# Patient Record
Sex: FEMALE | Race: WHITE | NOT HISPANIC OR LATINO | Employment: PART TIME | URBAN - NONMETROPOLITAN AREA
[De-identification: names, ages, dates, MRNs, and addresses within clinical notes are randomized per-mention and may not be internally consistent; named-entity substitution may affect disease eponyms.]

---

## 2017-08-22 ENCOUNTER — TELEPHONE (OUTPATIENT)
Dept: MEDICAL GROUP | Facility: CLINIC | Age: 42
End: 2017-08-22

## 2017-08-22 ENCOUNTER — NON-PROVIDER VISIT (OUTPATIENT)
Dept: MEDICAL GROUP | Facility: CLINIC | Age: 42
End: 2017-08-22
Payer: MEDICAID

## 2017-08-22 DIAGNOSIS — Z11.1 ENCOUNTER FOR PPD TEST: ICD-10-CM

## 2017-08-24 ENCOUNTER — APPOINTMENT (OUTPATIENT)
Dept: MEDICAL GROUP | Facility: CLINIC | Age: 42
End: 2017-08-24
Payer: MEDICAID

## 2017-11-20 ENCOUNTER — OFFICE VISIT (OUTPATIENT)
Dept: URGENT CARE | Facility: PHYSICIAN GROUP | Age: 42
End: 2017-11-20
Payer: MEDICAID

## 2017-11-20 VITALS
RESPIRATION RATE: 18 BRPM | HEIGHT: 67 IN | HEART RATE: 80 BPM | TEMPERATURE: 98.5 F | BODY MASS INDEX: 27.78 KG/M2 | OXYGEN SATURATION: 98 % | WEIGHT: 177 LBS

## 2017-11-20 DIAGNOSIS — J01.00 ACUTE NON-RECURRENT MAXILLARY SINUSITIS: ICD-10-CM

## 2017-11-20 DIAGNOSIS — L30.9 DERMATITIS: ICD-10-CM

## 2017-11-20 PROCEDURE — 99203 OFFICE O/P NEW LOW 30 MIN: CPT | Performed by: PHYSICIAN ASSISTANT

## 2017-11-20 RX ORDER — PREDNISONE 10 MG/1
TABLET ORAL
Qty: 30 QUANTITY SUFFICIENT | Refills: 0 | Status: SHIPPED | OUTPATIENT
Start: 2017-11-20 | End: 2017-12-17

## 2017-11-20 RX ORDER — IBUPROFEN 200 MG
200 TABLET ORAL EVERY 6 HOURS PRN
COMMUNITY
End: 2017-12-17

## 2017-11-20 RX ORDER — TRAMADOL HYDROCHLORIDE 50 MG/1
50 TABLET ORAL EVERY 4 HOURS PRN
COMMUNITY
End: 2017-12-17

## 2017-11-21 NOTE — PROGRESS NOTES
Chief Complaint   Patient presents with   • Rash   • Sinus Problem       HISTORY OF PRESENT ILLNESS: Patient is a 42 y.o. female who presents today for the following:    Patient comes in for evaluation of 2 separate issues. She complains of sinus congestion with clear drainage over the last couple of days. She complains of pressure in her face, ears, and a mild sore throat. Over-the-counter medication has not been helping. She also has a rash that she has had off and on for the past several years. She has done 10 scabies treatments over the last year with the most recent one being 3 days ago. She has not had any change in her symptoms doing these treatments. She complains of intense itching and has not had any change with over-the-counter medication or lotions. She has never been diagnosed with eczema or psoriasis. She had gastric bypass surgery 10 years ago and was diagnosed with lupus shortly after. She has had several courses of steroids since her bypass surgery and has not had any adverse effects.    There are no active problems to display for this patient.      Allergies:Diflucan [fd&c red #40 [place]-fluconazole]    Current Outpatient Prescriptions Ordered in [facility]   Medication Sig Dispense Refill   • tramadol (ULTRAM) 50 MG Tab Take 50 mg by mouth every four hours as needed.     • ibuprofen (MOTRIN) 200 MG Tab Take 200 mg by mouth every 6 hours as needed.     • predniSONE (DELTASONE) 10 MG Tab 40mg x 2 days; 30mg x 2 days; 20mg x 2 days; 10mg x 2 days; 5 mg x 2 days 30 Quantity Sufficient 0     No current Epic-ordered facility-administered medications on file.        No past medical history on file.    Social History   Substance Use Topics   • Smoking status: Current Every Day Smoker     Packs/day: 0.25   • Smokeless tobacco: Never Used   • Alcohol use No       No family status information on file.   No family history on file.    ROS:    Review of Systems   Constitutional: Negative for fever, chills, weight  "loss and malaise/fatigue.   HENT: Negative for nosebleeds,  and neck pain.    Eyes: Negative for blurred vision.   Respiratory: Negative for cough, sputum production, shortness of breath and wheezing.    Cardiovascular: Negative for chest pain, palpitations, orthopnea and leg swelling.   Gastrointestinal: Negative for heartburn, nausea, vomiting and abdominal pain.   Genitourinary: Negative for dysuria, urgency and frequency.       Exam:  Pulse 80, temperature 36.9 °C (98.5 °F), resp. rate 18, height 1.702 m (5' 7\"), weight 80.3 kg (177 lb), SpO2 98 %.  General: Well developed, well nourished. No distress.  HEENT: Conjunctiva clear, lids without ptosis, PERRL/EOMI. Ears normal shape and contour, canals are clear bilaterally, tympanic membranes are benign. Nasal mucosa benign. Oropharynx is without erythema, edema or exudates. Reasonable dentition.  Pulmonary: Clear to ausculation and percussion.  Normal effort. No rales, ronchi, or wheezing.   Cardiovascular: Regular rate and rhythm without murmur. No edema.   Neurologic: Grossly nonfocal.  Lymph: No cervical lymphadenopathy noted.  Skin: Scattered lesions that are vesicular in nature with areas of thick scaling skin. Almost the entire ulnar surface of the forearms are affected. Other lesions noted and bilateral axillary regions, gluteal folds, and bilateral EAC openings. Lesions are also noted around the mouth.  Psych: Normal mood. Alert and oriented x3. Judgment and insight is normal.    Assessment/Plan:  Discussed likely viral etiology of the sinus symptoms. Will treat dermatitis with steroids. Take all medications as directed. Follow-up with primary care provider for worsening or persistent symptoms.  1. Dermatitis  predniSONE (DELTASONE) 10 MG Tab   2. Acute non-recurrent maxillary sinusitis         "

## 2017-12-11 ENCOUNTER — OFFICE VISIT (OUTPATIENT)
Dept: URGENT CARE | Facility: PHYSICIAN GROUP | Age: 42
End: 2017-12-11
Payer: MEDICAID

## 2017-12-11 VITALS
SYSTOLIC BLOOD PRESSURE: 116 MMHG | DIASTOLIC BLOOD PRESSURE: 74 MMHG | HEART RATE: 74 BPM | BODY MASS INDEX: 26.3 KG/M2 | OXYGEN SATURATION: 100 % | HEIGHT: 67 IN | WEIGHT: 167.6 LBS | TEMPERATURE: 97.6 F | RESPIRATION RATE: 16 BRPM

## 2017-12-11 DIAGNOSIS — M06.9 RHEUMATOID ARTHRITIS INVOLVING MULTIPLE SITES, UNSPECIFIED RHEUMATOID FACTOR PRESENCE: ICD-10-CM

## 2017-12-11 PROCEDURE — 99214 OFFICE O/P EST MOD 30 MIN: CPT | Performed by: PHYSICIAN ASSISTANT

## 2017-12-11 RX ORDER — DICLOFENAC SODIUM 75 MG/1
75 TABLET, DELAYED RELEASE ORAL 2 TIMES DAILY
Qty: 60 TAB | Refills: 0 | Status: SHIPPED | OUTPATIENT
Start: 2017-12-11 | End: 2017-12-17

## 2017-12-11 NOTE — PROGRESS NOTES
Chief Complaint   Patient presents with   • Arthritis     sciatic pain Medication refill-tramadol       HISTORY OF PRESENT ILLNESS: Patient is a 42 y.o. female who presents today becauseShe like a refill on her Ultram. She is in the process of establishing with a pain management provider but cannot be seen for about a month, she has used Ultram in the past with decent pain management for chronic medical conditions including lupus, rheumatoid arthritis and sciatic back pain. She was advised that the  that we do not refill chronic pain medications through the urgent care. She has not been taking any other medications.    There are no active problems to display for this patient.      Allergies:Diflucan [fd&c red #40 [place]-fluconazole]    Current Outpatient Prescriptions Ordered in [facility]   Medication Sig Dispense Refill   • diclofenac EC (VOLTAREN) 75 MG Tablet Delayed Response Take 1 Tab by mouth 2 times a day. 60 Tab 0   • tramadol (ULTRAM) 50 MG Tab Take 50 mg by mouth every four hours as needed.     • ibuprofen (MOTRIN) 200 MG Tab Take 200 mg by mouth every 6 hours as needed.     • predniSONE (DELTASONE) 10 MG Tab 40mg x 2 days; 30mg x 2 days; 20mg x 2 days; 10mg x 2 days; 5 mg x 2 days 30 Quantity Sufficient 0     No current Epic-ordered facility-administered medications on file.        No past medical history on file.    Social History   Substance Use Topics   • Smoking status: Current Every Day Smoker     Packs/day: 0.25     Years: 6.00     Types: Cigarettes   • Smokeless tobacco: Never Used   • Alcohol use No       No family status information on file.   No family history on file.    ROS:  Review of Systems   Constitutional: Negative for fever, chills, weight loss and malaise/fatigue.   HENT: Negative for ear pain, nosebleeds, congestion, sore throat and neck pain.    Eyes: Negative for blurred vision.   Respiratory: Negative for cough, sputum production, shortness of breath and wheezing.   "  Cardiovascular: Negative for chest pain, palpitations, orthopnea and leg swelling.   Gastrointestinal: Negative for heartburn, nausea, vomiting and abdominal pain.   Genitourinary: Negative for dysuria, urgency and frequency.     Exam:  Blood pressure 116/74, pulse 74, temperature 36.4 °C (97.6 °F), resp. rate 16, height 1.702 m (5' 7\"), weight 76 kg (167 lb 9.6 oz), last menstrual period 12/05/2017, SpO2 100 %, not currently breastfeeding.  General:  Well nourished, well developed female in NAD  Head:Normocephalic, atraumatic  Eyes: PERRLA, EOM within normal limits, no conjunctival injection, no scleral icterus, visual fields and acuity grossly intact.  Extremities: no clubbing, cyanosis, or edema.    Please note that this dictation was created using voice recognition software. I have made every reasonable attempt to correct obvious errors, but I expect that there are errors of grammar and possibly content that I did not discover before finalizing the note.    Assessment/Plan:  1. Rheumatoid arthritis involving multiple sites, unspecified rheumatoid factor presence ([facility])  diclofenac EC (VOLTAREN) 75 MG Tablet Delayed Response       Followup with primary care in the next 7-10 days if not significantly improving, return to the urgent care or go to the emergency room sooner for any worsening of symptoms.       "

## 2017-12-11 NOTE — LETTER
December 11, 2017         Patient: Nancie Jernigan   YOB: 1975   Date of Visit: 12/11/2017           To Whom it May Concern:    Nancie Jernigan was seen in my clinic on 12/11/2017. She may return to work on 12/12/2017, Please excuse any recent absence.    If you have any questions or concerns, please don't hesitate to call.        Sincerely,           Ankit Quintana P.A.-C.  Electronically Signed

## 2017-12-17 ENCOUNTER — HOSPITAL ENCOUNTER (EMERGENCY)
Facility: MEDICAL CENTER | Age: 42
End: 2017-12-17
Attending: EMERGENCY MEDICINE
Payer: MEDICAID

## 2017-12-17 VITALS
SYSTOLIC BLOOD PRESSURE: 110 MMHG | BODY MASS INDEX: 26.75 KG/M2 | TEMPERATURE: 98.1 F | HEIGHT: 67 IN | DIASTOLIC BLOOD PRESSURE: 60 MMHG | RESPIRATION RATE: 16 BRPM | HEART RATE: 73 BPM | OXYGEN SATURATION: 100 % | WEIGHT: 170.42 LBS

## 2017-12-17 DIAGNOSIS — M54.32 SCIATICA OF LEFT SIDE: ICD-10-CM

## 2017-12-17 PROCEDURE — 99283 EMERGENCY DEPT VISIT LOW MDM: CPT

## 2017-12-17 RX ORDER — METHYLPREDNISOLONE 4 MG/1
TABLET ORAL
Qty: 1 KIT | Refills: 0 | Status: SHIPPED | OUTPATIENT
Start: 2017-12-17 | End: 2018-01-09

## 2017-12-17 ASSESSMENT — PAIN SCALES - GENERAL
PAINLEVEL_OUTOF10: 9
PAINLEVEL_OUTOF10: 10

## 2017-12-17 NOTE — ED NOTES
.  Chief Complaint   Patient presents with   • Low Back Pain     radiates down left leg x 4 days    ambulated to triage with left leg pain, reports sciatica pain x 4 days. Last episode in October and treated with steroids with some relief. Pt has referral to PT and to see neurosurgeon.

## 2017-12-18 NOTE — ED PROVIDER NOTES
"ED Provider Note      CHIEF COMPLAINT  Chief Complaint   Patient presents with   • Low Back Pain     radiates down left leg x 4 days        HPI  Nancie Jernigan is a 42 y.o. female who presentsWith back pain. Located lower lumbar region. Worse on the left side. Radiates down the back of the left leg to about the knee. She's had this pain periodically over the past years. It's worse over last 4 days. No particular inciting event. She was seen by primary doctor on day 1 of illness and given a prescription for a few Norco tablets. Arrangements are being made for the patient to have an MRI of her back. She's also been given a referral to spine surgery and physical therapy. The patient denies having any weakness in the extremity. No sensory loss or numbness. No bowel or bladder dysfunction or saddle anesthesia. She has not had fever. She reports she has had good relief of pain with steroids in the past.        REVIEW OF SYSTEMS  Denies any weakness in the lower extremities. No bowel or bladder incontinence or saddle anesthesia. No fevers or chills. No injection drug use or IV drug abuse. No abdominal pain, nausea or vomiting. No dysuria, hematuria or flank pain.    PAST MEDICAL HISTORY  Past Medical History:   Diagnosis Date   • Lupus        FAMILY HISTORY  History reviewed. No pertinent family history.    SOCIAL HISTORY  Social History   Substance Use Topics   • Smoking status: Current Every Day Smoker     Packs/day: 0.25     Years: 6.00     Types: Cigarettes   • Smokeless tobacco: Never Used   • Alcohol use No       SURGICAL HISTORY  History reviewed. No pertinent surgical history.    CURRENT MEDICATIONS  Home Medications    **Home medications have not yet been reviewed for this encounter**         ALLERGIES  Allergies   Allergen Reactions   • Diflucan [Fd&C Red #40 [place]-Fluconazole]          PHYSICAL EXAM  VITAL SIGNS: /57   Pulse 77   Temp 36.7 °C (98.1 °F)   Resp 14   Ht 1.702 m (5' 7\")   Wt 77.3 kg " (170 lb 6.7 oz)   LMP 12/05/2017   SpO2 100%   BMI 26.69 kg/m²   Constitutional: Well developed, Well nourished, No acute distress, Non-toxic appearance. Complaining of pain  Neck: Grossly normal range of motion  Cardiovascular: Normal heart rate   Thorax & Lungs: No respiratory distress  Abdomen: Bowel sounds normal, soft, non-distended, nontender, no masses.  Skin: Warm, Dry, No rash.   Back: DiffuseLeft lower lumbar and paraspinous muscular tenderness  Extremities: No clubbing, cyanosis, edema, no Homans or cords   Neurologic: Grossly normal cranial nerves, 5 out of 5 EHL, FHL, gastrocnemius, tibialis anterior. 2+ DTRs at the patellas bilaterally. Normal gait. Normal sensory throughout.    COURSE & MEDICAL DECISION MAKING    The patient presents with low back pain. There is no suggestion of cauda equina syndrome or Conner syndrome. No infectious symptoms. She'll be treated with a Medrol Dosepak. Advised to continue with planned follow-up with further evaluation and referrals as per primary provider. She is to return to the ER for worsening, not improving or concern.    Patient referred to primary for blood pressure management    FINAL IMPRESSION  1. Acute on chronic left-sided lumbar radiculopathy        This dictation was created using voice recognition software. The accuracy of the dictation is limited to the abilities of the software.  The nursing notes were reviewed and certain aspects of this information were incorporated into this note.    Electronically signed by: Ricardo Wong, 12/17/2017 4:10 PM

## 2017-12-18 NOTE — DISCHARGE INSTRUCTIONS
Sciatica  Sciatica is pain, weakness, numbness, or tingling along the path of the sciatic nerve. The nerve starts in the lower back and runs down the back of each leg. The nerve controls the muscles in the lower leg and in the back of the knee, while also providing sensation to the back of the thigh, lower leg, and the sole of your foot. Sciatica is a symptom of another medical condition. For instance, nerve damage or certain conditions, such as a herniated disk or bone spur on the spine, pinch or put pressure on the sciatic nerve. This causes the pain, weakness, or other sensations normally associated with sciatica. Generally, sciatica only affects one side of the body.  CAUSES   · Herniated or slipped disc.  · Degenerative disk disease.  · A pain disorder involving the narrow muscle in the buttocks (piriformis syndrome).  · Pelvic injury or fracture.  · Pregnancy.  · Tumor (rare).  SYMPTOMS   Symptoms can vary from mild to very severe. The symptoms usually travel from the low back to the buttocks and down the back of the leg. Symptoms can include:  · Mild tingling or dull aches in the lower back, leg, or hip.  · Numbness in the back of the calf or sole of the foot.  · Burning sensations in the lower back, leg, or hip.  · Sharp pains in the lower back, leg, or hip.  · Leg weakness.  · Severe back pain inhibiting movement.  These symptoms may get worse with coughing, sneezing, laughing, or prolonged sitting or standing. Also, being overweight may worsen symptoms.  DIAGNOSIS   Your caregiver will perform a physical exam to look for common symptoms of sciatica. He or she may ask you to do certain movements or activities that would trigger sciatic nerve pain. Other tests may be performed to find the cause of the sciatica. These may include:  · Blood tests.  · X-rays.  · Imaging tests, such as an MRI or CT scan.  TREATMENT   Treatment is directed at the cause of the sciatic pain. Sometimes, treatment is not necessary  and the pain and discomfort goes away on its own. If treatment is needed, your caregiver may suggest:  · Over-the-counter medicines to relieve pain.  · Prescription medicines, such as anti-inflammatory medicine, muscle relaxants, or narcotics.  · Applying heat or ice to the painful area.  · Steroid injections to lessen pain, irritation, and inflammation around the nerve.  · Reducing activity during periods of pain.  · Exercising and stretching to strengthen your abdomen and improve flexibility of your spine. Your caregiver may suggest losing weight if the extra weight makes the back pain worse.  · Physical therapy.  · Surgery to eliminate what is pressing or pinching the nerve, such as a bone spur or part of a herniated disk.  HOME CARE INSTRUCTIONS   · Only take over-the-counter or prescription medicines for pain or discomfort as directed by your caregiver.  · Apply ice to the affected area for 20 minutes, 3-4 times a day for the first 48-72 hours. Then try heat in the same way.  · Exercise, stretch, or perform your usual activities if these do not aggravate your pain.  · Attend physical therapy sessions as directed by your caregiver.  · Keep all follow-up appointments as directed by your caregiver.  · Do not wear high heels or shoes that do not provide proper support.  · Check your mattress to see if it is too soft. A firm mattress may lessen your pain and discomfort.  SEEK IMMEDIATE MEDICAL CARE IF:   · You lose control of your bowel or bladder (incontinence).  · You have increasing weakness in the lower back, pelvis, buttocks, or legs.  · You have redness or swelling of your back.  · You have a burning sensation when you urinate.  · You have pain that gets worse when you lie down or awakens you at night.  · Your pain is worse than you have experienced in the past.  · Your pain is lasting longer than 4 weeks.  · You are suddenly losing weight without reason.  MAKE SURE YOU:  · Understand these  instructions.  · Will watch your condition.  · Will get help right away if you are not doing well or get worse.     This information is not intended to replace advice given to you by your health care provider. Make sure you discuss any questions you have with your health care provider.     Document Released: 12/12/2002 Document Revised: 06/18/2013 Document Reviewed: 04/28/2013  [employer] Patient Education ©2016 [employer]

## 2017-12-20 ENCOUNTER — APPOINTMENT (OUTPATIENT)
Dept: PHYSICAL MEDICINE AND REHAB | Facility: MEDICAL CENTER | Age: 42
End: 2017-12-20
Payer: MEDICAID

## 2018-01-09 ENCOUNTER — HOSPITAL ENCOUNTER (OUTPATIENT)
Dept: RADIOLOGY | Facility: MEDICAL CENTER | Age: 43
End: 2018-01-09

## 2018-01-09 ENCOUNTER — OFFICE VISIT (OUTPATIENT)
Dept: PHYSICAL MEDICINE AND REHAB | Facility: MEDICAL CENTER | Age: 43
End: 2018-01-09
Payer: MEDICAID

## 2018-01-09 VITALS
WEIGHT: 176 LBS | OXYGEN SATURATION: 93 % | DIASTOLIC BLOOD PRESSURE: 72 MMHG | BODY MASS INDEX: 27.62 KG/M2 | HEART RATE: 76 BPM | TEMPERATURE: 98.3 F | SYSTOLIC BLOOD PRESSURE: 120 MMHG | HEIGHT: 67 IN

## 2018-01-09 DIAGNOSIS — G89.29 CHRONIC LEFT-SIDED LOW BACK PAIN WITH LEFT-SIDED SCIATICA: ICD-10-CM

## 2018-01-09 DIAGNOSIS — M54.42 CHRONIC LEFT-SIDED LOW BACK PAIN WITH LEFT-SIDED SCIATICA: ICD-10-CM

## 2018-01-09 DIAGNOSIS — M51.26 LUMBAR DISC HERNIATION: ICD-10-CM

## 2018-01-09 DIAGNOSIS — M54.16 LUMBAR RADICULOPATHY: ICD-10-CM

## 2018-01-09 PROCEDURE — 99204 OFFICE O/P NEW MOD 45 MIN: CPT | Performed by: PHYSICAL MEDICINE & REHABILITATION

## 2018-01-09 RX ORDER — GABAPENTIN 300 MG/1
300 CAPSULE ORAL 3 TIMES DAILY
Qty: 90 CAP | Refills: 3 | Status: SHIPPED | OUTPATIENT
Start: 2018-01-09 | End: 2018-01-24 | Stop reason: SDUPTHER

## 2018-01-09 RX ORDER — IBUPROFEN 800 MG/1
800 TABLET ORAL 2 TIMES DAILY
COMMUNITY
End: 2018-02-20

## 2018-01-09 NOTE — PROGRESS NOTES
New patient note    Physiatry (physical medicine and  Rehabilitation), interventional spine and sports medicine, Pain medicine    Date of Service: 1/9/2018    Chief complaint: low back pain    HISTORY    HPI: Nancie Jernigan 42 y.o. female who presents today with acute on chronic low back pain. Patient has had back pain for the past 4 years which has been worse over the past month.  Aching, radiating to left legs, constant 10/10 intensity. Worse with sitting. Denies injuries. The onset of this episode was acute and she states this happened while she was sleeping and getting up from bed. Patient is having functional difficulties with all activities involving bending including lower body dressing and putting on her shoes.      Medical records review:  I reviewed the note from Ricardo Wong, 12/17/2017 4:10 Pm. Patient was given a medrol dose pack in the Ed, arrangements were made for the patient to get an MRI and she was referred to spine surgery. No red flags at that time.    Previous treatments:    Physical Therapy: Yes the patient has been in physical therapy.     Medications the patient is tried: Medrol Dosepak, ibuprofen, muscle relaxers    Previous interventions: None    Previous surgeries to relieve the above pain:  None      ROS:   Red Flags ROS:   Fever, Chills, Sweats: Denies  Involuntary Weight Loss: Denies  Bladder Incontinence: Denies  Bowel Incontinence: denies  Saddle Anesthesia: Denies    All other systems reviewed and negative.       PMHx:   Past Medical History:   Diagnosis Date   • Lupus        PSHx:   History reviewed. No pertinent surgical history.    Family history   History reviewed. No pertinent family history.      Medications:   Current Outpatient Prescriptions   Medication   • ibuprofen (MOTRIN) 800 MG Tab   • gabapentin (NEURONTIN) 300 MG Cap     No current facility-administered medications for this visit.        Allergies:   Allergies   Allergen Reactions   • Diflucan [Fd&C Red #40 Al  [place]-Fluconazole]        Social Hx:   Social History     Social History   • Marital status: Single     Spouse name: N/A   • Number of children: N/A   • Years of education: N/A     Occupational History   • Not on file.     Social History Main Topics   • Smoking status: Current Every Day Smoker     Packs/day: 0.25     Years: 6.00     Types: Cigarettes   • Smokeless tobacco: Never Used   • Alcohol use No   • Drug use: No   • Sexual activity: Not on file     Other Topics Concern   •  Service No   • Blood Transfusions No   • Caffeine Concern No   • Occupational Exposure No   • Hobby Hazards No   • Sleep Concern Yes   • Stress Concern Yes   • Weight Concern No   • Special Diet No   • Back Care No   • Exercise No   • Bike Helmet No   • Seat Belt Yes   • Self-Exams Yes     Social History Narrative   • No narrative on file         EXAMINATION     Physical Exam:   Vitals: Blood pressure 120/72, pulse 76, temperature 36.8 °C (98.3 °F), height 1.702 m (5' 7\"), weight 79.8 kg (176 lb), SpO2 93 %.    Constitutional:   Body Habitus: Body mass index is 27.57 kg/m².  Cooperation: Fully cooperates with exam  Appearance: Well-groomed, well-nourished, not disheveled     Eyes: The patient is wearing sunglasses indoors and states that she prefers to wear sunglasses at all times including indoors because she does not like light    ENT -no obvious auditory deficits, no obvious tongue lesions, tongue midline, no facial droop     Skin -no rashes or lesions noted     Respiratory-  breathing comfortable on room air, no audible wheezing    Cardiovascular- capillary refills less than 2 seconds. No lower extremity edema is noted.     Gastrointestinal - no obvious abdominal masses, No tenderness to palpation in the abdomen    Psychiatric- alert and oriented ×3. Normal affect.     Gait - normal gait, no use of ambulatory device, nonantalgic. The patient has difficulty with toe walking. She is able to heel walk with mild " difficulty.      Thoracic/Lumbar Spine/Sacral Spine/Hips   Inspection: No evidence of atrophy in bilateral lower extremities throughout     ROM: There is pain with lumbar flexion which mildly improved with lumbar extension. Full range of motion in all directions and lumbar spine.    Palpation:   There is no tenderness to palpation throughout thoracic spine and thoracic paraspinal regions.  palpation over SI joint: negative bilaterally    palpation over buttock: negative bilaterally    palpation in hip or over the greater trochanters: negative bilaterally      Lumbar spine Special tests  Neuro tension  Straight leg test negative right, positive left    Slump test negative right, positive left      HIP  FAIR test negative bilaterally    Range of motion in the hips is within normal limits in flexion, extension, abduction, internal rotation, external rotation.    SI joint tests  Observation patient sits on one buttocks: Negative  SI joint compression negative bilaterally    SI joint distraction negative bilaterally    Thigh thrust test negative bilaterally    PARKER test negative right, positive left       Neuro     Sensory exam to the upper extremities is grossly intact at the [place] points from C5-T2     Sensory exam of the lower extremities is grossly intact at [place] points from L2-S2     Motor Exam Upper Extremities   ? Myotome R L   Shoulder flexion C5 5 5   Elbow flexion C5 5 5   Wrist extension C6 5 5   Elbow extension C7 5 5   Finger flexion C8 5 5   Finger abduction T1 5 5         Motor Exam Lower Extremities    ? Myotome R L   Hip flexion L2 5 5   Knee extension L3 5 5   Ankle dorsiflexion L4 5 5   Toe extension L5 5 5   Ankle plantarflexion S1 5 5           Tone on Modified May Scale    R L  R L      Hip Flexion Negative  Negative       Hip Extension Negative  Negative       Hip Adduction Negative  Negative       Knee Extension Negative  Negative       Knee Flexion Negative  Negative        Dorsiflexion Negative  Negative       Plantar Flexion Negative  Negative      Mcclain’s sign negative bilaterally   Babinski sign negative bilaterally   Clonus of the ankle negative bilaterally       MEDICAL DECISION MAKING    Medical records review: see under HPI section.     DATA    Labs:   No results found for: SODIUM, POTASSIUM, CHLORIDE, CO2, GLUCOSE, BUN, CREATININE, BUNCREATRAT, GLOMRATE     No results found for: PROTHROMBTM, INR     No results found for: WBC, RBC, HEMOGLOBIN, HEMATOCRIT, MCV, MCH, MCHC, MPV, NEUTSPOLYS, LYMPHOCYTES, MONOCYTES, EOSINOPHILS, BASOPHILS, HYPOCHROMIA, ANISOCYTOSIS       Imaging: Images are not available for my interpretation of the time as patient    IMAGING radiology reads. I reviewed the following radiology reads        MRI lumbar spine                                                                                                             Diagnosis   Diagnoses of Lumbar radiculopathy left S1 nerve, Chronic left-sided low back pain with left-sided sciatica, and Lumbar disc herniation left L5-S1 were pertinent to this visit.        ASSESSMENT:  Nancie Jernigan 42 y.o. female with significant left low back pain radiating down the left leg into the left foot and heel with positive neural tension signs on exam and imaging consistent with an L5-S1 disc herniation with impingement of the left S1 nerve root.     Nancie was seen today for new patient.    Diagnoses and all orders for this visit:    Lumbar radiculopathy left S1 nerve  -     gabapentin (NEURONTIN) 300 MG Cap; Take 1 Cap by mouth 3 times a day.  -     REFERRAL TO PHYSICIAL MEDICINE REHAB    Chronic left-sided low back pain with left-sided sciatica  -     gabapentin (NEURONTIN) 300 MG Cap; Take 1 Cap by mouth 3 times a day.  -     REFERRAL TO PHYSICIAL MEDICINE REHAB    Lumbar disc herniation left L5-S1  -     gabapentin (NEURONTIN) 300 MG Cap; Take 1 Cap by mouth 3 times a day.  -     REFERRAL TO PHYSICIAL MEDICINE  REHAB      PLAN  Physical therapy: Failed physical therapy    Medications: I prescribed gabapentin 300 mg 3 times a day. I recommend avoiding narcotic medications in this patient.    Interventional program: I ordered a left L5-S1 and S1 transforaminal epidural steroid injections with fluoroscopic guidance to be done in the procedure suite.     Outside records requested:  The patient signed outside records request form for his outside records including outside images.    Follow-up: 2 weeks after the procedure. Should the patient did well with the procedure and I will likely recommend physical therapy following the procedure.    Total time: 60  minutes face-to-face time. I spent greater than 50% of the time for patient care and coordination on this date, including with the patient as per assessment and plan above.         Please note that this dictation was created using voice recognition software. I have made every reasonable attempt to correct obvious errors but there may be errors of grammar and content that I may have overlooked prior to finalization of this note.      Erick Yoder MD  Physical Medicine and Rehabilitation  Interventional Spine and Sports Physiatry  [facility]  1/9/2018  10:25 AM             ISABEL Fisher

## 2018-01-15 ENCOUNTER — HOSPITAL ENCOUNTER (OUTPATIENT)
Dept: PAIN MANAGEMENT | Facility: REHABILITATION | Age: 43
End: 2018-01-15
Attending: PHYSICAL MEDICINE & REHABILITATION
Payer: MEDICAID

## 2018-01-24 DIAGNOSIS — M54.42 CHRONIC LEFT-SIDED LOW BACK PAIN WITH LEFT-SIDED SCIATICA: ICD-10-CM

## 2018-01-24 DIAGNOSIS — M54.16 LUMBAR RADICULOPATHY: ICD-10-CM

## 2018-01-24 DIAGNOSIS — M51.26 LUMBAR DISC HERNIATION: ICD-10-CM

## 2018-01-24 DIAGNOSIS — G89.29 CHRONIC LEFT-SIDED LOW BACK PAIN WITH LEFT-SIDED SCIATICA: ICD-10-CM

## 2018-01-24 RX ORDER — GABAPENTIN 300 MG/1
600 CAPSULE ORAL 3 TIMES DAILY
Qty: 180 CAP | Refills: 3 | Status: SHIPPED | OUTPATIENT
Start: 2018-01-24 | End: 2018-02-20 | Stop reason: SDUPTHER

## 2018-01-24 NOTE — PROGRESS NOTES
The patient was taking gabapentin 600 mg 3 times per day with improved pain relief and no significant side effects. A prescription was written for gabapentin 600 mg 3 times a day.

## 2018-01-29 ENCOUNTER — HOSPITAL ENCOUNTER (OUTPATIENT)
Dept: PAIN MANAGEMENT | Facility: REHABILITATION | Age: 43
End: 2018-01-29
Attending: PHYSICAL MEDICINE & REHABILITATION
Payer: MEDICAID

## 2018-02-08 ENCOUNTER — APPOINTMENT (OUTPATIENT)
Dept: PHYSICAL MEDICINE AND REHAB | Facility: MEDICAL CENTER | Age: 43
End: 2018-02-08
Payer: MEDICAID

## 2018-02-20 ENCOUNTER — APPOINTMENT (OUTPATIENT)
Dept: RADIOLOGY | Facility: MEDICAL CENTER | Age: 43
End: 2018-02-20
Attending: EMERGENCY MEDICINE
Payer: MEDICAID

## 2018-02-20 ENCOUNTER — OFFICE VISIT (OUTPATIENT)
Dept: PHYSICAL MEDICINE AND REHAB | Facility: MEDICAL CENTER | Age: 43
End: 2018-02-20
Payer: MEDICAID

## 2018-02-20 ENCOUNTER — HOSPITAL ENCOUNTER (EMERGENCY)
Facility: MEDICAL CENTER | Age: 43
End: 2018-02-20
Attending: EMERGENCY MEDICINE
Payer: MEDICAID

## 2018-02-20 VITALS
DIASTOLIC BLOOD PRESSURE: 70 MMHG | TEMPERATURE: 97.8 F | RESPIRATION RATE: 15 BRPM | WEIGHT: 170 LBS | HEART RATE: 57 BPM | BODY MASS INDEX: 26.68 KG/M2 | HEIGHT: 67 IN | SYSTOLIC BLOOD PRESSURE: 114 MMHG

## 2018-02-20 VITALS
DIASTOLIC BLOOD PRESSURE: 64 MMHG | TEMPERATURE: 99 F | SYSTOLIC BLOOD PRESSURE: 108 MMHG | OXYGEN SATURATION: 100 % | HEART RATE: 85 BPM

## 2018-02-20 DIAGNOSIS — M54.16 LUMBAR RADICULOPATHY: ICD-10-CM

## 2018-02-20 DIAGNOSIS — M51.26 LUMBAR DISC HERNIATION: ICD-10-CM

## 2018-02-20 DIAGNOSIS — M54.17 LUMBOSACRAL RADICULOPATHY AT L5: ICD-10-CM

## 2018-02-20 DIAGNOSIS — M54.42 CHRONIC LEFT-SIDED LOW BACK PAIN WITH LEFT-SIDED SCIATICA: ICD-10-CM

## 2018-02-20 DIAGNOSIS — G89.29 CHRONIC LEFT-SIDED LOW BACK PAIN WITH LEFT-SIDED SCIATICA: ICD-10-CM

## 2018-02-20 PROCEDURE — 700102 HCHG RX REV CODE 250 W/ 637 OVERRIDE(OP): Performed by: EMERGENCY MEDICINE

## 2018-02-20 PROCEDURE — A9270 NON-COVERED ITEM OR SERVICE: HCPCS | Performed by: EMERGENCY MEDICINE

## 2018-02-20 PROCEDURE — 96375 TX/PRO/DX INJ NEW DRUG ADDON: CPT

## 2018-02-20 PROCEDURE — 72148 MRI LUMBAR SPINE W/O DYE: CPT

## 2018-02-20 PROCEDURE — 700111 HCHG RX REV CODE 636 W/ 250 OVERRIDE (IP): Performed by: EMERGENCY MEDICINE

## 2018-02-20 PROCEDURE — 99284 EMERGENCY DEPT VISIT MOD MDM: CPT

## 2018-02-20 PROCEDURE — 99214 OFFICE O/P EST MOD 30 MIN: CPT | Performed by: PHYSICAL MEDICINE & REHABILITATION

## 2018-02-20 PROCEDURE — 96374 THER/PROPH/DIAG INJ IV PUSH: CPT

## 2018-02-20 RX ORDER — IBUPROFEN 800 MG/1
800 TABLET ORAL EVERY 8 HOURS PRN
COMMUNITY
End: 2018-03-12

## 2018-02-20 RX ORDER — ONDANSETRON 2 MG/ML
4 INJECTION INTRAMUSCULAR; INTRAVENOUS ONCE
Status: COMPLETED | OUTPATIENT
Start: 2018-02-20 | End: 2018-02-20

## 2018-02-20 RX ORDER — KETOROLAC TROMETHAMINE 30 MG/ML
30 INJECTION, SOLUTION INTRAMUSCULAR; INTRAVENOUS ONCE
Status: COMPLETED | OUTPATIENT
Start: 2018-02-20 | End: 2018-02-20

## 2018-02-20 RX ORDER — OXYCODONE HCL 10 MG/1
10 TABLET, FILM COATED, EXTENDED RELEASE ORAL ONCE
Status: COMPLETED | OUTPATIENT
Start: 2018-02-20 | End: 2018-02-20

## 2018-02-20 RX ORDER — CYCLOBENZAPRINE HCL 10 MG
10 TABLET ORAL 3 TIMES DAILY PRN
COMMUNITY
End: 2018-07-30

## 2018-02-20 RX ORDER — METHYLPREDNISOLONE 4 MG/1
TABLET ORAL
Qty: 1 KIT | Refills: 0 | Status: SHIPPED | OUTPATIENT
Start: 2018-02-20 | End: 2018-03-12

## 2018-02-20 RX ORDER — GABAPENTIN 300 MG/1
900 CAPSULE ORAL 3 TIMES DAILY
Qty: 180 CAP | Refills: 3 | Status: SHIPPED | OUTPATIENT
Start: 2018-02-20 | End: 2018-08-22 | Stop reason: SDUPTHER

## 2018-02-20 RX ADMIN — KETOROLAC TROMETHAMINE 30 MG: 30 INJECTION, SOLUTION INTRAMUSCULAR at 18:22

## 2018-02-20 RX ADMIN — OXYCODONE HYDROCHLORIDE 10 MG: 10 TABLET, FILM COATED, EXTENDED RELEASE ORAL at 18:23

## 2018-02-20 RX ADMIN — ONDANSETRON HYDROCHLORIDE 4 MG: 2 INJECTION, SOLUTION INTRAMUSCULAR; INTRAVENOUS at 16:01

## 2018-02-20 RX ADMIN — HYDROMORPHONE HYDROCHLORIDE 1 MG: 10 INJECTION, SOLUTION INTRAMUSCULAR; INTRAVENOUS; SUBCUTANEOUS at 15:45

## 2018-02-20 ASSESSMENT — PAIN SCALES - GENERAL: PAINLEVEL_OUTOF10: 2

## 2018-02-20 NOTE — ED PROVIDER NOTES
ED Provider Note    CHIEF COMPLAINT  No chief complaint on file.      HPI  Nancie Jernigan is a 42 y.o. female who presents for evaluation of acute worsening of low back pain with reported left-sided leg weakness and tingling. She has been having debilitating and increasing pain she has been followed by pain management. Patient was seen today. She had no MRI last month demonstrate the following:    MRI lumbar spine     Large extruded disc herniation L5-S1 with impingement on the left S1 nerve root and L5 nerve roots. Small disc herniation L4-5 with high intensity zone               IMAGING radiology reads. I reviewed the following radiology reads        She has been taking OxyContin for pain. Was previously on tramadol. No improvement and the patient has had increasing debility. She left her pain management visit today and twisted her back and felt a subjective pop and now has worsening left leg weakness. She reports 10 out of 10 pain no incontinence. No prior history of back surgery                                                                                    REVIEW OF SYSTEMS  See HPI for further details. No high fevers chills night was weight loss numbness tingling or weakness All other systems are negative.     PAST MEDICAL HISTORY  Past Medical History:   Diagnosis Date   • Lupus        FAMILY HISTORY  No history of bleeding disorder    SOCIAL HISTORY  Social History     Social History   • Marital status: Single     Spouse name: N/A   • Number of children: N/A   • Years of education: N/A     Social History Main Topics   • Smoking status: Current Every Day Smoker     Packs/day: 0.25     Years: 6.00     Types: Cigarettes   • Smokeless tobacco: Never Used   • Alcohol use No   • Drug use: No   • Sexual activity: Not on file     Other Topics Concern   •  Service No   • Blood Transfusions No   • Caffeine Concern No   • Occupational Exposure No   • Hobby Hazards No   • Sleep Concern Yes   • Stress  "Concern Yes   • Weight Concern No   • Special Diet No   • Back Care No   • Exercise No   • Bike Helmet No   • Seat Belt Yes   • Self-Exams Yes     Social History Narrative   • No narrative on file     Denies IV drugs  SURGICAL HISTORY  No past surgical history on file.  No prior back surgery  CURRENT MEDICATIONS    Current Facility-Administered Medications:   •  HYDROmorphone (DILAUDID) injection 1 mg, 1 mg, Intravenous, Once, Jermain Johnson M.D.    Current Outpatient Prescriptions:   •  ibuprofen (MOTRIN) 800 MG Tab, Take 800 mg by mouth every 8 hours as needed., Disp: , Rfl:   •  cyclobenzaprine (FLEXERIL) 10 MG Tab, Take 10 mg by mouth 3 times a day as needed., Disp: , Rfl:   •  gabapentin (NEURONTIN) 300 MG Cap, Take 3 Caps by mouth 3 times a day., Disp: 180 Cap, Rfl: 3  Oxycodone 10 mg    ALLERGIES  Allergies   Allergen Reactions   • Diflucan [Fd&C Red #40 [place]-Fluconazole]        PHYSICAL EXAM  VITAL SIGNS: /70   Pulse (!) 57   Temp 36.6 °C (97.8 °F)   Resp 15   Ht 1.702 m (5' 7\")   Wt 77.1 kg (170 lb)   LMP 02/05/2018   BMI 26.63 kg/m²  Room air O2: 100    Constitutional: Anxious to be in pain   ENT: Normocephalic, Atraumatic, Bilateral external ears normal, Oropharynx moist, No oral exudates, Nose normal.   Eyes: PERRLA, EOMI, Conjunctiva normal, No discharge.   Neck: Normal range of motion, No tenderness, Supple, No stridor.   Cardiovascular: Normal heart rate, Normal rhythm, No murmurs, No rubs, No gallops.   Thorax & Lungs: Normal breath sounds, No respiratory distress, No wheezing, No chest tenderness.   Abdomen: Bowel sounds normal, Soft, No tenderness, No masses, No pulsatile masses.   Skin: Warm, Dry, No erythema, No rash.   Back exquisite lumbar tenderness especially at L5 on the left side.   Genitalia: Groin sensation is normal  Extremities: Intact distal pulses, No edema, No tenderness, No cyanosis, No clubbing.   Musculoskeletal: Good range of motion in all major joints. No " tenderness to palpation or major deformities noted.   Neurologic: Alert & oriented x 3, Normal motor function, Normal sensory function, No focal deficits noted. Bilateral patellar DTRs intact  Psychiatric anxious        RADIOLOGY/PROCEDURES  Preliminary read by radiologist demonstrates no significant change of the large L5-S1 extrusion. He reviewed it from the prior imaging there appears to be no change    COURSE & MEDICAL DECISION MAKING  Pertinent Labs & Imaging studies reviewed. (See chart for details)  An IV was established and patient was given some IV narcotics as well as antiemetics. I ordered a stat MRI because she reports new symptoms but clinically her exam appears to be stable. I did not feel the laboratory studies were indicated or useful. Stat MRI demonstrates no significant change from prior underlying known pathology of which spine and pain management were already aware. Reviewed the patient's narcotic history. She apparently was given a 30 day supply of oxycodone on 7 February and she reportedly finished all those. She did not make this known to her pain management doctor because she saw him today. I advised her that I cannot legally prescribe any additional pain medication. I will start her on a Medrol Dosepak and refer her back for pain management physician for ongoing management    FINAL IMPRESSION  1.  1. Lumbosacral radiculopathy at L5      2. Inappropriate use of opioids       Electronically signed by: Jermain Johnson, 2/20/2018 3:07 PM

## 2018-02-20 NOTE — PROGRESS NOTES
Follow up patient note    Physiatry (physical medicine and  Rehabilitation), interventional spine and sports medicine, Pain medicine    Date of Service: 1/9/2018    Chief complaint: low back pain    HISTORY    HPI: Nancie Jernigan 42 y.o. female who presents today with acute on chronic low back pain. Patient has had back pain for the past 4 years which has been worse over the past several months.  Aching, radiating to left legs, constant 10/10 intensity. Worse with sitting. Denies injuries. The onset of this episode was acute and she states this happened while she was sleeping and getting up from bed. Patient is having functional difficulties with all activities involving bending including lower body dressing and putting on her shoes. The patient previously was a no-show for a lumbar epidural which was scheduled for the procedure suite. I am seeing her now in clinic and the patient wishes to proceed with the above procedure. Symptoms of an worsening since previous visit the patient has been several her care and ER visits.      Previous surgeries to relieve the above pain:  None      ROS:   Red Flags ROS:   Fever, Chills, Sweats: Denies  Involuntary Weight Loss: Denies  Bladder Incontinence: Denies  Bowel Incontinence: denies  Saddle Anesthesia: Denies    All other systems reviewed and negative.       PMHx:   Past Medical History:   Diagnosis Date   • Lupus        PSHx:   History reviewed. No pertinent surgical history.    Family history   History reviewed. No pertinent family history.      Medications:   Current Outpatient Prescriptions   Medication   • gabapentin (NEURONTIN) 300 MG Cap   • ibuprofen (MOTRIN) 800 MG Tab     No current facility-administered medications for this visit.        Allergies:   Allergies   Allergen Reactions   • Diflucan [Fd&C Red #40 [place]-Fluconazole]        Social Hx:   Social History     Social History   • Marital status: Single     Spouse name: N/A   • Number of children: N/A   •  Years of education: N/A     Occupational History   • Not on file.     Social History Main Topics   • Smoking status: Current Every Day Smoker     Packs/day: 0.25     Years: 6.00     Types: Cigarettes   • Smokeless tobacco: Never Used   • Alcohol use No   • Drug use: No   • Sexual activity: Not on file     Other Topics Concern   •  Service No   • Blood Transfusions No   • Caffeine Concern No   • Occupational Exposure No   • Hobby Hazards No   • Sleep Concern Yes   • Stress Concern Yes   • Weight Concern No   • Special Diet No   • Back Care No   • Exercise No   • Bike Helmet No   • Seat Belt Yes   • Self-Exams Yes     Social History Narrative   • No narrative on file         EXAMINATION     Physical Exam:   Vitals: Blood pressure 108/64, pulse 85, temperature 37.2 °C (99 °F), SpO2 100 %.    Constitutional:   Body Habitus: There is no height or weight on file to calculate BMI.  Cooperation: Fully cooperates with exam  Appearance: Well-groomed, well-nourished, not disheveled     Respiratory-  breathing comfortable on room air, no audible wheezing    Cardiovascular- capillary refills less than 2 seconds. No lower extremity edema is noted.     Gastrointestinal - no obvious abdominal masses, No tenderness to palpation in the abdomen    Psychiatric- alert and oriented ×3. Normal affect.     Gait - normal gait, no use of ambulatory device, nonantalgic. The patient has difficulty with toe walking. She is able to heel walk with mild difficulty.      Thoracic/Lumbar Spine/Sacral Spine/Hips   Inspection: No evidence of atrophy in bilateral lower extremities throughout     ROM: There is pain with lumbar flexion which mildly improved with lumbar extension. Full range of motion in all directions and lumbar spine.    Palpation:   There is no tenderness to palpation throughout thoracic spine and thoracic paraspinal regions.  palpation over SI joint: negative bilaterally    palpation over buttock: negative bilaterally     palpation in hip or over the greater trochanters: negative bilaterally      Lumbar spine Special tests  Neuro tension  Straight leg test negative right, positive left    Slump test negative right, positive left      HIP  FAIR test negative bilaterally    Range of motion in the hips is within normal limits in flexion, extension, abduction, internal rotation, external rotation.    SI joint tests  Observation patient sits on one buttocks: Negative  SI joint compression negative bilaterally    SI joint distraction negative bilaterally    Thigh thrust test negative bilaterally    PARKER test negative right, positive left       Neuro     Sensory exam to the upper extremities is grossly intact at the [place]ah points from C5-T2     Sensory exam of the lower extremities is grossly intact at [place] points from L2-S2       Key points for the international standards for neurological classification of spinal cord injury (ISNCSCI) to light touch.     Dermatome R L                                      L2 2 2   L3 2 2   L4 2 2   L5 2 2   S1 2 1   S2 2 2           Motor Exam Lower Extremities    ? Myotome R L   Hip flexion L2 5 5   Knee extension L3 5 5   Ankle dorsiflexion L4 5 5   Toe extension L5 5 5-   Ankle plantarflexion S1 5 5-           Tone on Modified May Scale    R L  R L      Hip Flexion Negative  Negative       Hip Extension Negative  Negative       Hip Adduction Negative  Negative       Knee Extension Negative  Negative       Knee Flexion Negative  Negative       Dorsiflexion Negative  Negative       Plantar Flexion Negative  Negative      Mcclain’s sign negative bilaterally   Babinski sign negative bilaterally   Clonus of the ankle negative bilaterally       MEDICAL DECISION MAKING    Medical records review: see under HPI section.     DATA    Labs:   No results found for: SODIUM, POTASSIUM, CHLORIDE, CO2, GLUCOSE, BUN, CREATININE, BUNCREATRAT, GLOMRATE     No results found for: PROTHROMBTM, INR     No results  found for: WBC, RBC, HEMOGLOBIN, HEMATOCRIT, MCV, MCH, MCHC, MPV, NEUTSPOLYS, LYMPHOCYTES, MONOCYTES, EOSINOPHILS, BASOPHILS, HYPOCHROMIA, ANISOCYTOSIS       Imaging:   I personally reviewed the following images  MRI lumbar spine     Large extruded disc herniation L5-S1 with impingement on the left S1 nerve root and L5 nerve roots. Small disc herniation L4-5 with high intensity zone             IMAGING radiology reads. I reviewed the following radiology reads        MRI lumbar spine                                                                                                   Diagnosis   Diagnoses of Lumbar radiculopathy left L5 and S1 nerve, Chronic left-sided low back pain with left-sided sciatica, and Lumbar disc herniation left L5-S1 and L4-5 were pertinent to this visit.        ASSESSMENT:  Nancie Jernigan 42 y.o. female with significant left low back pain radiating down the left leg into the left foot and heel with positive neural tension signs on exam and imaging consistent with an L5-S1 disc herniation with impingement of the left S1 nerve root and L5 nerve root. Pain is worsened since previous visit. Symptoms are worsening since previous visit. The patient was a no-show to the previous lumbar epidural. I counseled her on being compliant with medical management.     Diagnoses and all orders for this visit:    Lumbar radiculopathy left L5 and S1 nerve  -     gabapentin (NEURONTIN) 300 MG Cap; Take 3 Caps by mouth 3 times a day.  -     REFERRAL TO PHYSICIAL MEDICINE REHAB    Chronic left-sided low back pain with left-sided sciatica  -     gabapentin (NEURONTIN) 300 MG Cap; Take 3 Caps by mouth 3 times a day.  -     REFERRAL TO PHYSICIAL MEDICINE REHAB    Lumbar disc herniation left L5-S1 and L4-5  -     gabapentin (NEURONTIN) 300 MG Cap; Take 3 Caps by mouth 3 times a day.  -     REFERRAL TO PHYSICIAL MEDICINE REHAB      PLAN  Physical therapy: Failed physical therapy    Medications: I prescribed  gabapentin 900 mg 3 times a day. I recommend avoiding narcotic medications in this patient.    Interventional program: I ordered a left L5-S1 and S1 transforaminal epidural steroid injections with fluoroscopic guidance to be done in the procedure suite.      The risks benefits and alternatives to this procedure were discussed and the patient wishes to proceed with the procedure. Risks include but are not limited to damage to surrounding structures, infection, bleeding, worsening of pain which can be permanent, weakness which can be permanent. Benefits include pain relief, improved function. Alternatives includes not doing the procedure.       Follow-up: 2 weeks after the procedure. Should the patient did well with the procedure and I will likely recommend physical therapy following the procedure.    Total time: 25 minutes face-to-face time. I spent greater than 50% of the time for patient care and coordination on this date, including with the patient as per assessment and plan above.         Please note that this dictation was created using voice recognition software. I have made every reasonable attempt to correct obvious errors but there may be errors of grammar and content that I may have overlooked prior to finalization of this note.      Erick Yoder MD  Physical Medicine and Rehabilitation  Interventional Spine and Sports Physiatry  [facility]  2/20/2018   10:33 AM              CC ISABEL Leigh

## 2018-02-20 NOTE — ED TRIAGE NOTES
Pt brought in via EMS. For back pain. Pt. States that she was seen and treated at another ER for same symptoms. Pt also states that she went to a specialist for same symptoms earlier today.

## 2018-02-21 NOTE — DISCHARGE INSTRUCTIONS
Lumbosacral Radiculopathy  Lumbosacral radiculopathy is a condition that involves the spinal nerves and nerve roots in the low back and bottom of the spine. The condition develops when these nerves and nerve roots move out of place or become inflamed and cause symptoms.  CAUSES  This condition may be caused by:  · Pressure from a disk that bulges out of place (herniated disk). A disk is a plate of cartilage that separates bones in the spine.  · Disk degeneration.  · A narrowing of the bones of the lower back (spinal stenosis).  · A tumor.  · An infection.  · An injury that places sudden pressure on the disks that cushion the bones of your lower spine.  RISK FACTORS  This condition is more likely to develop in:  · Males aged 30-50 years.  · Females aged 50-60 years.  · People who lift improperly.  · People who are overweight or live a sedentary lifestyle.  · People who smoke.  · People who perform repetitive activities that strain the spine.  SYMPTOMS  Symptoms of this condition include:  · Pain that goes down from the back into the legs (sciatica). This is the most common symptom. The pain may be worse with sitting, coughing, or sneezing.  · Pain and numbness in the arms and legs.  · Muscle weakness.  · Tingling.  · Loss of bladder control or bowel control.  DIAGNOSIS  This condition is diagnosed with a physical exam and medical history. If the pain is lasting, you may have tests, such as:  · MRI scan.  · X-ray.  · CT scan.  · Myelogram.  · Nerve conduction study.  TREATMENT  This condition is often treated with:  · Hot packs and ice applied to affected areas.  · Stretches to improve flexibility.  · Exercises to strengthen back muscles.  · Physical therapy.  · Pain medicine.  · A steroid injection in the spine.  In some cases, no treatment is needed. If the condition is long-lasting (chronic), or if symptoms are severe, treatment may involve surgery or lifestyle changes, such as following a weight loss plan.  HOME  CARE INSTRUCTIONS  Medicines  · Take medicines only as directed by your health care provider.  · Do not drive or operate heavy machinery while taking pain medicine.  Injury Care  · Apply a heat pack to the injured area as directed by your health care provider.  · Apply ice to the affected area:  ¨ Put ice in a plastic bag.  ¨ Place a towel between your skin and the bag.  ¨ Leave the ice on for 20-30 minutes, every 2 hours while you are awake or as needed. Or, leave the ice on for as long as directed by your health care provider.  Other Instructions  · If you were shown how to do any exercises or stretches, do them as directed by your health care provider.  · If your health care provider prescribed a diet or exercise program, follow it as directed.  · Keep all follow-up visits as directed by your health care provider. This is important.  SEEK MEDICAL CARE IF:  · Your pain does not improve over time even when taking pain medicines.  SEEK IMMEDIATE MEDICAL CARE IF:  · Your develop severe pain.  · Your pain suddenly gets worse.  · You develop increasing weakness in your legs.  · You lose the ability to control your bladder or bowel.  · You have difficulty walking or balancing.  · You have a fever.     This information is not intended to replace advice given to you by your health care provider. Make sure you discuss any questions you have with your health care provider.     Document Released: 12/18/2006 Document Revised: 05/03/2016 Document Reviewed: 12/14/2015  [employer] Interactive Patient Education ©2016 [employer]

## 2018-02-21 NOTE — ED NOTES
Pt is resting, eyes closed respirations are even and unlabored. No signs of discomfort or distress

## 2018-03-09 NOTE — PROGRESS NOTES
PAT note: PAT call made 03/09/2018 at 1329.No answer at number providedd. Mailbox full. Unable to leave message.

## 2018-03-12 ENCOUNTER — HOSPITAL ENCOUNTER (OUTPATIENT)
Dept: PAIN MANAGEMENT | Facility: REHABILITATION | Age: 43
End: 2018-03-12
Attending: PHYSICAL MEDICINE & REHABILITATION
Payer: MEDICAID

## 2018-03-12 ENCOUNTER — HOSPITAL ENCOUNTER (OUTPATIENT)
Dept: RADIOLOGY | Facility: REHABILITATION | Age: 43
End: 2018-03-12
Attending: PHYSICAL MEDICINE & REHABILITATION
Payer: MEDICAID

## 2018-03-12 VITALS
HEART RATE: 96 BPM | BODY MASS INDEX: 26.61 KG/M2 | HEIGHT: 67 IN | SYSTOLIC BLOOD PRESSURE: 125 MMHG | WEIGHT: 169.53 LBS | TEMPERATURE: 97.2 F | OXYGEN SATURATION: 98 % | DIASTOLIC BLOOD PRESSURE: 77 MMHG | RESPIRATION RATE: 16 BRPM

## 2018-03-12 PROCEDURE — 700111 HCHG RX REV CODE 636 W/ 250 OVERRIDE (IP)

## 2018-03-12 PROCEDURE — 700117 HCHG RX CONTRAST REV CODE 255

## 2018-03-12 PROCEDURE — 64484 NJX AA&/STRD TFRM EPI L/S EA: CPT

## 2018-03-12 PROCEDURE — 64483 NJX AA&/STRD TFRM EPI L/S 1: CPT

## 2018-03-12 RX ORDER — FUROSEMIDE 20 MG/1
20 TABLET ORAL DAILY
COMMUNITY
End: 2020-08-10

## 2018-03-12 RX ORDER — LIDOCAINE HYDROCHLORIDE 10 MG/ML
INJECTION, SOLUTION EPIDURAL; INFILTRATION; INTRACAUDAL; PERINEURAL
Status: COMPLETED
Start: 2018-03-12 | End: 2018-03-12

## 2018-03-12 RX ORDER — DEXAMETHASONE SODIUM PHOSPHATE 10 MG/ML
INJECTION, SOLUTION INTRAMUSCULAR; INTRAVENOUS
Status: COMPLETED
Start: 2018-03-12 | End: 2018-03-12

## 2018-03-12 RX ORDER — METHADONE HYDROCHLORIDE 5 MG/1
5 TABLET ORAL 2 TIMES DAILY
COMMUNITY
End: 2018-03-28

## 2018-03-12 RX ORDER — SPIRONOLACTONE 25 MG/1
25 TABLET ORAL DAILY
COMMUNITY
End: 2018-07-30

## 2018-03-12 RX ORDER — OXYCODONE HCL 10 MG/1
10 TABLET, FILM COATED, EXTENDED RELEASE ORAL 4 TIMES DAILY
COMMUNITY
End: 2018-03-28

## 2018-03-12 RX ORDER — POTASSIUM CHLORIDE 1.5 G/1.58G
20 POWDER, FOR SOLUTION ORAL DAILY
COMMUNITY
End: 2020-08-10

## 2018-03-12 RX ADMIN — DEXAMETHASONE SODIUM PHOSPHATE 20 MG: 10 INJECTION, SOLUTION INTRAMUSCULAR; INTRAVENOUS at 15:04

## 2018-03-12 RX ADMIN — LIDOCAINE HYDROCHLORIDE 10 ML: 10 INJECTION, SOLUTION EPIDURAL; INFILTRATION; INTRACAUDAL; PERINEURAL at 15:03

## 2018-03-12 RX ADMIN — IOHEXOL 8 ML: 240 INJECTION, SOLUTION INTRATHECAL; INTRAVASCULAR; INTRAVENOUS; ORAL at 14:45

## 2018-03-12 ASSESSMENT — PAIN SCALES - GENERAL
PAINLEVEL_OUTOF10: 3
PAINLEVEL_OUTOF10: 3

## 2018-03-12 NOTE — PROCEDURES
Date of Service: see epic time stamp for DOS    Patient: Nancie Jernigan 42 y.o. female     MRN: [id number]     Physician/s: Erick Yoder MD    Pre-operative Diagnosis: Lumbar radiculopathy    Post-operative Diagnosis: Lumbar radiculopathy    Procedure: left Lumbar Transforaminal Epidural Steroid  at the L5-S1 and S1 levels.     Description of procedure:    The risks, benefits, and alternatives of the procedure were reviewed and discussed with the patient.  Written informed consent was freely obtained. A pre-procedural time-out was conducted by the physician verifying patient’s identity, procedure to be performed, procedure site and side, and allergy verification. Appropriate equipment was determined to be in place for the procedure. The patient did have some anxiety preprocedure and she wishes to proceed with the procedure today.    The patient's vital signs were carefully monitored before, throughout, and after the procedure.     In the fluoroscopy suite the patient was placed in a prone position, a pillow placed underneath their umbilicus. The skin was prepped and draped in the usual sterile fashion. The fluoroscope was placed over the lumbar spine and adjusted into the proper AP/Oblique view to enter the transforaminal space at the levels below. The targets for injection were then marked at the left L5-S1 and S1.  A 25g 3.5 inch spinal needle was then placed and advanced under fluoroscopic guidance in an oblique view towards the subpedicular epidural space of the levels noted below. The needle position was confirmed to not be past the 6 o'clock position in the AP view and it was in the neural foramen and the lateral view. Under live fluoroscopic guidance in the AP view, contrast dye was used to highlight the epidural space spread.  Following negative aspiration, approx 1mL of 1% lidocaine preservative free with 10 mg of dexamethasone was then injected at each level, and the needles were removed intact after  restyleted. The patient's back was covered with a 4x4 gauze, the area was cleansed with sterile normal saline, and a dressing was applied. There were no complications noted.     The patient was then evaluated post-procedure, and was hemodynamically stable prior to leaving the post-operative care unit.     Erick Yoder MD  Physical Medicine and Rehabilitation  Interventional Spine and Sports Physiatry  [facility]

## 2018-03-12 NOTE — PROGRESS NOTES
Pt given verbal and written d/c instructions and verbalizes understanding. denies nsaid use in last 3 days, denies blood thinners use in last 5 days, denies current infection or abx use. Med rec complete. Pt has post procedural ride home with her uncle Tim.

## 2018-03-14 ENCOUNTER — TELEPHONE (OUTPATIENT)
Dept: PHYSICAL MEDICINE AND REHAB | Facility: MEDICAL CENTER | Age: 43
End: 2018-03-14

## 2018-03-15 NOTE — TELEPHONE ENCOUNTER
Spoke to Pt Uncle and he mentioned that Nancie is doing pretty good after the S/P that was done last Monday w/ Dr. Yoder.    Thank you

## 2018-03-20 ENCOUNTER — HOSPITAL ENCOUNTER (EMERGENCY)
Facility: MEDICAL CENTER | Age: 43
End: 2018-03-20
Attending: EMERGENCY MEDICINE
Payer: MEDICAID

## 2018-03-20 ENCOUNTER — OFFICE VISIT (OUTPATIENT)
Dept: URGENT CARE | Facility: PHYSICIAN GROUP | Age: 43
End: 2018-03-20
Payer: MEDICAID

## 2018-03-20 ENCOUNTER — APPOINTMENT (OUTPATIENT)
Dept: RADIOLOGY | Facility: MEDICAL CENTER | Age: 43
End: 2018-03-20
Attending: EMERGENCY MEDICINE
Payer: MEDICAID

## 2018-03-20 VITALS
OXYGEN SATURATION: 99 % | HEART RATE: 66 BPM | WEIGHT: 175.93 LBS | DIASTOLIC BLOOD PRESSURE: 51 MMHG | SYSTOLIC BLOOD PRESSURE: 101 MMHG | TEMPERATURE: 97.8 F | BODY MASS INDEX: 27.61 KG/M2 | HEIGHT: 67 IN | RESPIRATION RATE: 17 BRPM

## 2018-03-20 VITALS
BODY MASS INDEX: 27.31 KG/M2 | OXYGEN SATURATION: 98 % | TEMPERATURE: 98.5 F | RESPIRATION RATE: 16 BRPM | HEIGHT: 67 IN | HEART RATE: 96 BPM | SYSTOLIC BLOOD PRESSURE: 122 MMHG | WEIGHT: 174 LBS | DIASTOLIC BLOOD PRESSURE: 72 MMHG

## 2018-03-20 DIAGNOSIS — M79.89 PAIN AND SWELLING OF LEFT LOWER EXTREMITY: ICD-10-CM

## 2018-03-20 DIAGNOSIS — M79.604 PAIN AND SWELLING OF LOWER EXTREMITY, RIGHT: ICD-10-CM

## 2018-03-20 DIAGNOSIS — R06.09 DYSPNEA ON EXERTION: ICD-10-CM

## 2018-03-20 DIAGNOSIS — R60.9 PITTING EDEMA: ICD-10-CM

## 2018-03-20 DIAGNOSIS — M79.89 PAIN AND SWELLING OF LOWER EXTREMITY, RIGHT: ICD-10-CM

## 2018-03-20 DIAGNOSIS — R06.02 SHORTNESS OF BREATH: ICD-10-CM

## 2018-03-20 DIAGNOSIS — M79.605 PAIN AND SWELLING OF LEFT LOWER EXTREMITY: ICD-10-CM

## 2018-03-20 LAB
ALBUMIN SERPL BCP-MCNC: 2.3 G/DL (ref 3.2–4.9)
ALBUMIN/GLOB SERPL: 1 G/DL
ALP SERPL-CCNC: 133 U/L (ref 30–99)
ALT SERPL-CCNC: 35 U/L (ref 2–50)
ANION GAP SERPL CALC-SCNC: 8 MMOL/L (ref 0–11.9)
APTT PPP: 27.1 SEC (ref 24.7–36)
AST SERPL-CCNC: 24 U/L (ref 12–45)
BASOPHILS # BLD AUTO: 0.5 % (ref 0–1.8)
BASOPHILS # BLD: 0.03 K/UL (ref 0–0.12)
BILIRUB SERPL-MCNC: 0.6 MG/DL (ref 0.1–1.5)
BNP SERPL-MCNC: 15 PG/ML (ref 0–100)
BUN SERPL-MCNC: 16 MG/DL (ref 8–22)
CALCIUM SERPL-MCNC: 7.9 MG/DL (ref 8.5–10.5)
CHLORIDE SERPL-SCNC: 100 MMOL/L (ref 96–112)
CO2 SERPL-SCNC: 25 MMOL/L (ref 20–33)
CREAT SERPL-MCNC: 0.64 MG/DL (ref 0.5–1.4)
EOSINOPHIL # BLD AUTO: 0.07 K/UL (ref 0–0.51)
EOSINOPHIL NFR BLD: 1.3 % (ref 0–6.9)
ERYTHROCYTE [DISTWIDTH] IN BLOOD BY AUTOMATED COUNT: 58.1 FL (ref 35.9–50)
GLOBULIN SER CALC-MCNC: 2.4 G/DL (ref 1.9–3.5)
GLUCOSE SERPL-MCNC: 87 MG/DL (ref 65–99)
HCT VFR BLD AUTO: 37.6 % (ref 37–47)
HGB BLD-MCNC: 11.7 G/DL (ref 12–16)
IMM GRANULOCYTES # BLD AUTO: 0.02 K/UL (ref 0–0.11)
IMM GRANULOCYTES NFR BLD AUTO: 0.4 % (ref 0–0.9)
INR PPP: 1.12 (ref 0.87–1.13)
LIPASE SERPL-CCNC: 22 U/L (ref 11–82)
LYMPHOCYTES # BLD AUTO: 2.24 K/UL (ref 1–4.8)
LYMPHOCYTES NFR BLD: 40.4 % (ref 22–41)
MCH RBC QN AUTO: 23.4 PG (ref 27–33)
MCHC RBC AUTO-ENTMCNC: 31.1 G/DL (ref 33.6–35)
MCV RBC AUTO: 75.4 FL (ref 81.4–97.8)
MONOCYTES # BLD AUTO: 0.54 K/UL (ref 0–0.85)
MONOCYTES NFR BLD AUTO: 9.7 % (ref 0–13.4)
NEUTROPHILS # BLD AUTO: 2.64 K/UL (ref 2–7.15)
NEUTROPHILS NFR BLD: 47.7 % (ref 44–72)
NRBC # BLD AUTO: 0 K/UL
NRBC BLD-RTO: 0 /100 WBC
PLATELET # BLD AUTO: 336 K/UL (ref 164–446)
PMV BLD AUTO: 10.8 FL (ref 9–12.9)
POTASSIUM SERPL-SCNC: 4 MMOL/L (ref 3.6–5.5)
PROT SERPL-MCNC: 4.7 G/DL (ref 6–8.2)
PROTHROMBIN TIME: 14.1 SEC (ref 12–14.6)
RBC # BLD AUTO: 4.99 M/UL (ref 4.2–5.4)
SODIUM SERPL-SCNC: 133 MMOL/L (ref 135–145)
TROPONIN I SERPL-MCNC: <0.01 NG/ML (ref 0–0.04)
WBC # BLD AUTO: 5.5 K/UL (ref 4.8–10.8)

## 2018-03-20 PROCEDURE — 93000 ELECTROCARDIOGRAM COMPLETE: CPT | Performed by: PHYSICIAN ASSISTANT

## 2018-03-20 PROCEDURE — 85025 COMPLETE CBC W/AUTO DIFF WBC: CPT

## 2018-03-20 PROCEDURE — 93005 ELECTROCARDIOGRAM TRACING: CPT | Performed by: EMERGENCY MEDICINE

## 2018-03-20 PROCEDURE — 83690 ASSAY OF LIPASE: CPT

## 2018-03-20 PROCEDURE — 85610 PROTHROMBIN TIME: CPT

## 2018-03-20 PROCEDURE — 93005 ELECTROCARDIOGRAM TRACING: CPT

## 2018-03-20 PROCEDURE — 85730 THROMBOPLASTIN TIME PARTIAL: CPT

## 2018-03-20 PROCEDURE — 93970 EXTREMITY STUDY: CPT

## 2018-03-20 PROCEDURE — 80053 COMPREHEN METABOLIC PANEL: CPT

## 2018-03-20 PROCEDURE — 71045 X-RAY EXAM CHEST 1 VIEW: CPT

## 2018-03-20 PROCEDURE — 83880 ASSAY OF NATRIURETIC PEPTIDE: CPT

## 2018-03-20 PROCEDURE — 36415 COLL VENOUS BLD VENIPUNCTURE: CPT

## 2018-03-20 PROCEDURE — 99214 OFFICE O/P EST MOD 30 MIN: CPT | Performed by: PHYSICIAN ASSISTANT

## 2018-03-20 PROCEDURE — 84484 ASSAY OF TROPONIN QUANT: CPT

## 2018-03-20 PROCEDURE — 99284 EMERGENCY DEPT VISIT MOD MDM: CPT

## 2018-03-20 ASSESSMENT — ENCOUNTER SYMPTOMS
WEAKNESS: 1
SHORTNESS OF BREATH: 1
NUMBNESS: 1
MYALGIAS: 1
NAUSEA: 1
NECK PAIN: 0
ARTHRALGIAS: 1
DOUBLE VISION: 0
HEADACHES: 1
FEVER: 0
BLURRED VISION: 0
VOMITING: 0
BACK PAIN: 1
FATIGUE: 1
CHILLS: 0
SORE THROAT: 0
ANOREXIA: 0
COUGH: 0
LEG SWELLING: 1

## 2018-03-20 ASSESSMENT — PAIN SCALES - GENERAL: PAINLEVEL_OUTOF10: 0

## 2018-03-20 NOTE — ED TRIAGE NOTES
Feels sob, BLE swelling 2+ pitting edema.  Went to urgent care and sent here for concerns of CHF.  Patient has a history of lupus.  NO noted fevers.  Vitals otherwise stable.  No CP.  Positive nausea.

## 2018-03-20 NOTE — PROGRESS NOTES
Subjective:   Nancie Jernigan is a 42 y.o. female who presents for Leg Swelling    Pt reports c/o foul taste in mouth x 1 week. Reports a pocket of something on the roof of the mouth which popped 1 week ago creating pus in the mouth. She states that this felt like it was connected to her sinuses. Denies dental pain. Denies recent cold symptoms, nasal congestion, cough or fever.   Reports feeling like is chocking on the mucous draining.       2-3 weeks ago she was seen by ER at [facility] for low back pain and was given a pain shot into the nerve.  Shortly thereafter developed numbess of the toes and then swelling of the legs. Now with worsening swelling. Pt did not call that provider regarding this issue. Then seen in the ER at [facility] for increase in swelling .  She was given spirinolactone for 5 days but no change and is actually worsening.   C/O pain in particular in the calves.     New feeling of short of breath x 2 days. No chest pain. Also with AGGARWAL.      Leg Swelling   This is a new problem. The current episode started 1 to 4 weeks ago. The problem occurs constantly. The problem has been gradually worsening. Associated symptoms include arthralgias, congestion, fatigue, headaches, myalgias, nausea, numbness and weakness. Pertinent negatives include no anorexia, chest pain, chills, coughing, fever, neck pain, rash, sore throat, urinary symptoms or vomiting. Nothing aggravates the symptoms.     Review of Systems   Constitutional: Positive for fatigue and malaise/fatigue. Negative for chills and fever.   HENT: Positive for congestion. Negative for sore throat.    Eyes: Negative for blurred vision and double vision.   Respiratory: Positive for shortness of breath. Negative for cough.    Cardiovascular: Positive for leg swelling. Negative for chest pain.   Gastrointestinal: Positive for nausea. Negative for anorexia and vomiting.   Musculoskeletal: Positive for arthralgias, back pain, joint pain and  "myalgias. Negative for neck pain.   Skin: Negative for rash.   Neurological: Positive for weakness, numbness and headaches.   All other systems reviewed and are negative.    Allergies   Allergen Reactions   • Diflucan [Fd&C Red #40 [place]-Fluconazole]         Objective:   /72   Pulse 96   Temp 36.9 °C (98.5 °F)   Resp 16   Ht 1.702 m (5' 7\")   Wt 78.9 kg (174 lb)   SpO2 98%   BMI 27.25 kg/m²   Physical Exam   Constitutional: She is oriented to person, place, and time. She appears well-developed and well-nourished.   HENT:   Head: Normocephalic and atraumatic.   Right Ear: External ear normal.   Left Ear: External ear normal.   Nose: Nose normal.   Mouth/Throat: Oropharynx is clear and moist. No oropharyngeal exudate.   Eyes: Conjunctivae and EOM are normal. Pupils are equal, round, and reactive to light.   Neck: Normal range of motion. Neck supple.   Cardiovascular: Normal rate, regular rhythm and normal heart sounds.  Exam reveals no gallop and no friction rub.    No murmur heard.  3+ pitting edema bilat LE   Pulmonary/Chest: Effort normal. She has rales.   Crackles bilat bases   Abdominal: Soft. Bowel sounds are normal. She exhibits no distension and no mass. There is no tenderness. There is no rebound and no guarding.   Musculoskeletal: Normal range of motion. She exhibits edema. She exhibits no tenderness or deformity.   Lymphadenopathy:     She has no cervical adenopathy.   Neurological: She is alert and oriented to person, place, and time.   Skin: Skin is warm and dry. No rash noted.   Psychiatric: She has a normal mood and affect. Judgment normal.           Assessment/Plan:   Assessment    1. Pitting edema  - DX-CHEST-2 VIEWS  - EKG - Clinic Performed    EKG:   Rate 77  Rhythm: NSR  Qtc 435  Findings: no St segment elevations, no inverted T's.      Xray not available at this time. Recommend further eval in ER as I am concerned about the amount of edema, her AGGARWAL and the possibility of CHF. " Offered [facility] transport: pt declines. Pt will have her uncle take her. No report called as she is unsure which facility she will go to.       2. Dyspnea on exertion  See above: To ER NOW

## 2018-03-21 LAB — EKG IMPRESSION: NORMAL

## 2018-03-21 NOTE — DISCHARGE INSTRUCTIONS
Shortness of Breath  Shortness of breath means you have trouble breathing. Shortness of breath needs medical care right away.  HOME CARE   · Do not smoke.  · Avoid being around chemicals or things (paint fumes, dust) that may bother your breathing.  · Rest as needed. Slowly begin your normal activities.  · Only take medicines as told by your doctor.  · Keep all doctor visits as told.  GET HELP RIGHT AWAY IF:   · Your shortness of breath gets worse.  · You feel lightheaded, pass out (faint), or have a cough that is not helped by medicine.  · You cough up blood.  · You have pain with breathing.  · You have pain in your chest, arms, shoulders, or belly (abdomen).  · You have a fever.  · You cannot walk up stairs or exercise the way you normally do.  · You do not get better in the time expected.  · You have a hard time doing normal activities even with rest.  · You have problems with your medicines.  · You have any new symptoms.  MAKE SURE YOU:  · Understand these instructions.  · Will watch your condition.  · Will get help right away if you are not doing well or get worse.  This information is not intended to replace advice given to you by your health care provider. Make sure you discuss any questions you have with your health care provider.  Document Released: 06/05/2009 Document Revised: 12/23/2014 Document Reviewed: 03/04/2013  [employer] Interactive Patient Education © 2017 [employer]    Edema  Edema is an abnormal buildup of fluids in your body tissues. Edema is somewhat dependent on gravity to pull the fluid to the lowest place in your body. That makes the condition more common in the legs and thighs (lower extremities). Painless swelling of the feet and ankles is common and becomes more likely as you get older. It is also common in looser tissues, like around your eyes.  When the affected area is squeezed, the fluid may move out of that spot and leave a dent for a few moments. This dent is called pitting.  What  are the causes?  There are many possible causes of edema. Eating too much salt and being on your feet or sitting for a long time can cause edema in your legs and ankles. Hot weather may make edema worse. Common medical causes of edema include:  · Heart failure.  · Liver disease.  · Kidney disease.  · Weak blood vessels in your legs.  · Cancer.  · An injury.  · Pregnancy.  · Some medications.  · Obesity.  What are the signs or symptoms?  Edema is usually painless. Your skin may look swollen or shiny.  How is this diagnosed?  Your health care provider may be able to diagnose edema by asking about your medical history and doing a physical exam. You may need to have tests such as X-rays, an electrocardiogram, or blood tests to check for medical conditions that may cause edema.  How is this treated?  Edema treatment depends on the cause. If you have heart, liver, or kidney disease, you need the treatment appropriate for these conditions. General treatment may include:  · Elevation of the affected body part above the level of your heart.  · Compression of the affected body part. Pressure from elastic bandages or support stockings squeezes the tissues and forces fluid back into the blood vessels. This keeps fluid from entering the tissues.  · Restriction of fluid and salt intake.  · Use of a water pill (diuretic). These medications are appropriate only for some types of edema. They pull fluid out of your body and make you urinate more often. This gets rid of fluid and reduces swelling, but diuretics can have side effects. Only use diuretics as directed by your health care provider.  Follow these instructions at home:  · Keep the affected body part above the level of your heart when you are lying down.  · Do not sit still or stand for prolonged periods.  · Do not put anything directly under your knees when lying down.  · Do not wear constricting clothing or garters on your upper legs.  · Exercise your legs to work the fluid  back into your blood vessels. This may help the swelling go down.  · Wear elastic bandages or support stockings to reduce ankle swelling as directed by your health care provider.  · Eat a low-salt diet to reduce fluid if your health care provider recommends it.  · Only take medicines as directed by your health care provider.  Contact a health care provider if:  · Your edema is not responding to treatment.  · You have heart, liver, or kidney disease and notice symptoms of edema.  · You have edema in your legs that does not improve after elevating them.  · You have sudden and unexplained weight gain.  Get help right away if:  · You develop shortness of breath or chest pain.  · You cannot breathe when you lie down.  · You develop pain, redness, or warmth in the swollen areas.  · You have heart, liver, or kidney disease and suddenly get edema.  · You have a fever and your symptoms suddenly get worse.  This information is not intended to replace advice given to you by your health care provider. Make sure you discuss any questions you have with your health care provider.  Document Released: 12/18/2006 Document Revised: 05/25/2017 Document Reviewed: 10/10/2014  [employer] Interactive Interactive Patient Education © 2017 [employer]

## 2018-03-21 NOTE — ED PROVIDER NOTES
"ER Provider Note         CHIEF COMPLAINT  Chief Complaint   Patient presents with   • Ankle Swelling     BLE    • Lupus       HPI  Nancie Jernigan is a 42 y.o. female who presents to the Emergency Department  with swelling in her bilateral lower extremities. The patient also mentions that she's been having some slightly worsening exertional dyspnea. She denies any chest pain or shortness of breath at rest. She's also mentions that she's been feeling slightly tired. She has no nausea vomiting or diarrhea. He says if she lays back she does not have any shortness of breath. The patient has noted some weeping from her bilateral lower extremities as well. Mild pain.    REVIEW OF SYSTEMS  See HPI for further details. All other systems are negative.     PAST MEDICAL HISTORY   has a past medical history of Lupus.    SURGICAL HISTORY  patient denies any surgical history    SOCIAL HISTORY  Social History   Substance Use Topics   • Smoking status: Current Every Day Smoker     Packs/day: 0.25     Years: 6.00     Types: Cigarettes   • Smokeless tobacco: Never Used   • Alcohol use No      History   Drug Use No       FAMILY HISTORY  No family history on file.    CURRENT MEDICATIONS  Home Medications    **Home medications have not yet been reviewed for this encounter**         ALLERGIES  Allergies   Allergen Reactions   • Diflucan [Fd&C Red #40 [place]-Fluconazole]        PHYSICAL EXAM  VITAL SIGNS: /51   Pulse 66   Temp 36.6 °C (97.8 °F)   Resp 17   Ht 1.702 m (5' 7\")   Wt 79.8 kg (175 lb 14.8 oz)   SpO2 99%   BMI 27.55 kg/m²      Constitutional: Alert in no apparent distress.  HENT: No signs of trauma, Bilateral external ears normal, Nose normal.   Eyes: Pupils are equal and reactive, Conjunctiva normal, Non-icteric.   Neck: Normal range of motion, No tenderness, Supple, No stridor.   Lymphatic: No lymphadenopathy noted.   Cardiovascular: Regular rate and rhythm, no murmurs.   Thorax & Lungs: Normal breath sounds, " No respiratory distress, No wheezing, No chest tenderness.   Abdomen: Bowel sounds normal, Soft, No tenderness, No masses, No pulsatile masses. No peritoneal signs.  Skin: Warm, Dry, No erythema, No rash.   Back: No bony tenderness, No CVA tenderness.   Extremities: Intact distal pulses, 2+ edema, No tenderness, No cyanosis.  Musculoskeletal: Good range of motion in all major joints. No tenderness to palpation or major deformities noted.   Neurologic: Alert , Normal motor function, Normal sensory function, No focal deficits noted.   Psychiatric: Affect normal, Judgment normal, Mood normal.     DIAGNOSTIC STUDIES / PROCEDURES    EKG Interpretation:  Interpreted by me  No ST-T wave changes and no ectopy normal sinus rhythm.       LABS  Labs Reviewed   CBC WITH DIFFERENTIAL - Abnormal; Notable for the following:        Result Value    Hemoglobin 11.7 (*)     MCV 75.4 (*)     MCH 23.4 (*)     MCHC 31.1 (*)     RDW 58.1 (*)     All other components within normal limits    Narrative:     Indicate which anticoagulants the patient is on:->UNKNOWN   COMP METABOLIC PANEL - Abnormal; Notable for the following:     Sodium 133 (*)     Calcium 7.9 (*)     Alkaline Phosphatase 133 (*)     Albumin 2.3 (*)     Total Protein 4.7 (*)     All other components within normal limits    Narrative:     Indicate which anticoagulants the patient is on:->UNKNOWN   TROPONIN    Narrative:     Indicate which anticoagulants the patient is on:->UNKNOWN   BTYPE NATRIURETIC PEPTIDE    Narrative:     Indicate which anticoagulants the patient is on:->UNKNOWN   PROTHROMBIN TIME    Narrative:     Indicate which anticoagulants the patient is on:->UNKNOWN   APTT    Narrative:     Indicate which anticoagulants the patient is on:->UNKNOWN   LIPASE    Narrative:     Indicate which anticoagulants the patient is on:->UNKNOWN   ESTIMATED GFR    Narrative:     Indicate which anticoagulants the patient is on:->UNKNOWN       All labs reviewed by  me.    RADIOLOGY  LE VENOUS DUPLEX (DVT)   Final Result      DX-CHEST-LIMITED (1 VIEW)   Final Result      No acute cardiopulmonary process is identified.          The radiologist's interpretation of all radiological studies have been reviewed by me.    COURSE & MEDICAL DECISION MAKING  Pertinent Labs & Imaging studies reviewed. (See chart for details)    This is a 42 y.o. female that presents with lower extrem swelling as well as mild shortness of breath. I'm concerned for DVT versus renal dysfunciton failure versus congestive heart failure. We'll get a chest x-ray as well as DVT study and BNP to assess this.    Patient was not found to have a DVT. The patient was found to have a normal chest x-ray. In addition the BNP is also normal. The patient does have a mild anemia which appears to be baseline. The patient's sodium is 133. At this time the patient is no longer complaining of shortness of breath. It could be related to deconditioning as well.    Given the negative chest x-ray and no evidence of congestive heart failure DVT of discharge the patient home with follow-up with the primary care physician. There is no evidence of cellulitis or infection in the bilateral lower extremities as well.        FINAL IMPRESSION  1. Pain and swelling of left lower extremity    2. Pain and swelling of lower extremity, right    3. Shortness of breath              Electronically signed by: Shahriar Chatman, 3/20/2018

## 2018-03-21 NOTE — ED NOTES
PT BROUGHT BACK TO ROOM 38, NO RESP DISTRESS, HAD TROUBLE WALKING DUE TO SWOLLEN LEGS PER Pt, LUNGS DIMINISHED IN BASES, NO CRACKLES HEARD, IV ATTEMPTED BUT UNSUCCESSFUL.

## 2018-03-21 NOTE — ED NOTES
Pt given all dc instructions and follow up info. Pt verbalized understanding. Pts IV removed intact/bandaged. Pt ambulated to lobby with family with steady gait.

## 2018-03-27 ENCOUNTER — APPOINTMENT (OUTPATIENT)
Dept: PHYSICAL MEDICINE AND REHAB | Facility: MEDICAL CENTER | Age: 43
End: 2018-03-27
Payer: MEDICAID

## 2018-03-28 ENCOUNTER — OFFICE VISIT (OUTPATIENT)
Dept: PHYSICAL MEDICINE AND REHAB | Facility: MEDICAL CENTER | Age: 43
End: 2018-03-28
Payer: MEDICAID

## 2018-03-28 VITALS
HEART RATE: 67 BPM | SYSTOLIC BLOOD PRESSURE: 108 MMHG | WEIGHT: 176 LBS | BODY MASS INDEX: 27.62 KG/M2 | HEIGHT: 67 IN | OXYGEN SATURATION: 98 % | DIASTOLIC BLOOD PRESSURE: 64 MMHG | TEMPERATURE: 97.1 F

## 2018-03-28 DIAGNOSIS — M51.26 LUMBAR DISC HERNIATION: ICD-10-CM

## 2018-03-28 DIAGNOSIS — M54.42 CHRONIC LEFT-SIDED LOW BACK PAIN WITH LEFT-SIDED SCIATICA: ICD-10-CM

## 2018-03-28 DIAGNOSIS — L93.0 LUPUS ERYTHEMATOSUS, UNSPECIFIED FORM: ICD-10-CM

## 2018-03-28 DIAGNOSIS — M54.16 LUMBAR RADICULOPATHY: ICD-10-CM

## 2018-03-28 DIAGNOSIS — G89.29 CHRONIC LEFT-SIDED LOW BACK PAIN WITH LEFT-SIDED SCIATICA: ICD-10-CM

## 2018-03-28 DIAGNOSIS — F11.90 CHRONIC, CONTINUOUS USE OF OPIOIDS: ICD-10-CM

## 2018-03-28 DIAGNOSIS — M79.89 LEG SWELLING: ICD-10-CM

## 2018-03-28 PROCEDURE — 99214 OFFICE O/P EST MOD 30 MIN: CPT | Performed by: PHYSICAL MEDICINE & REHABILITATION

## 2018-03-28 ASSESSMENT — PAIN SCALES - GENERAL: PAINLEVEL: 6=MODERATE PAIN

## 2018-03-28 NOTE — PROGRESS NOTES
Follow up patient note    Physiatry (physical medicine and  Rehabilitation), interventional spine and sports medicine, Pain medicine    Date of Service: 3/28/2018     Chief complaint: low back pain    HISTORY: Acute on chronic low back pain radiating down the left leg secondary to left lower lumbar radiculopathy. The patient has multiple visits to the ER    Interval history:  Procedures:   left Lumbar Transforaminal Epidural Steroid  at the L5-S1 and S1 levels.     Following the procedure the patient had significant reduction in the radicular component of her pain and now is complaining mostly of axial left lower back pain.     The patient decided to stop taking methadone as well as oxycodone on her own. These medications were prescribed by Dr. Reilly per the patient. She states that he stop these medications abruptly and did not discuss with her prescribing physician. Shortly after discontinuing these medications the patient experienced diarrhea, flulike symptoms, worsening with swelling of the bilateral lower extremity. The patient also has a history of systemic lupus erythematosus and has not followed up with her rheumatologist.    I reviewed the note from GISSEL Argueta who states that the patient was seen in the emergency department at [facility] for increased swelling and was given spironolactone which actually made the swelling worsened.    The patient reports being hospitalized for malnutrition and proximally 3 days ago. These records are not available at this time.    I reviewed the notes in the emergency physician Dr. Chatman from 3/20/2018. The patient has swelling of bilateral lower extremities which is noncardiac and not secondary to DVT. No signs of infection.    ROS:   Red Flags ROS:   Fever, Chills, Sweats: Denies  Involuntary Weight Loss: Denies  Bladder Incontinence: Denies  Bowel Incontinence: denies  Saddle Anesthesia: Denies    All other systems reviewed and negative.       PMHx:   Past  "Medical History:   Diagnosis Date   • Lupus        PSHx:   Past Surgical History:   Procedure Laterality Date   • PRIMARY C SECTION         Family history   History reviewed. No pertinent family history.      Medications:   Current Outpatient Prescriptions   Medication   • gabapentin (NEURONTIN) 300 MG Cap   • cyclobenzaprine (FLEXERIL) 10 MG Tab   • furosemide (LASIX) 20 MG Tab   • spironolactone (ALDACTONE) 25 MG Tab   • potassium chloride (KLOR-CON) 20 MEQ Pack     No current facility-administered medications for this visit.        Allergies:   Allergies   Allergen Reactions   • Diflucan [Fd&C Red #40 [place]-Fluconazole]        Social Hx:   Social History     Social History   • Marital status: Single     Spouse name: N/A   • Number of children: N/A   • Years of education: N/A     Occupational History   • Not on file.     Social History Main Topics   • Smoking status: Current Some Day Smoker     Packs/day: 0.25     Years: 6.00     Types: Cigarettes   • Smokeless tobacco: Current User   • Alcohol use No   • Drug use: No   • Sexual activity: Not on file     Other Topics Concern   •  Service No   • Blood Transfusions No   • Caffeine Concern No   • Occupational Exposure No   • Hobby Hazards No   • Sleep Concern Yes   • Stress Concern Yes   • Weight Concern No   • Special Diet No   • Back Care No   • Exercise No   • Bike Helmet No   • Seat Belt Yes   • Self-Exams Yes     Social History Narrative   • No narrative on file         EXAMINATION     Physical Exam:   Vitals: Blood pressure 108/64, pulse 67, temperature 36.2 °C (97.1 °F), height 1.702 m (5' 7\"), weight 79.8 kg (176 lb), SpO2 98 %.    Constitutional:   Body Habitus: Body mass index is 27.57 kg/m².  Cooperation: Fully cooperates with exam  Appearance: Well-groomed, well-nourished, not disheveled     Respiratory-  breathing comfortable on room air, no audible wheezing    Cardiovascular- capillary refills less than 2 seconds. 1+ pitting edema in the " bilateral lower extremities to the mid tibia bilaterally. Symmetrical edema. No tenderness to palpation.    Gastrointestinal - no obvious abdominal masses, No tenderness to palpation in the abdomen    Psychiatric- alert and oriented ×3. Normal affect.     Gait - normal gait, no use of ambulatory device, nonantalgic. The patient is able to heel walk raising her do toes completely off of the ground, the patient is able to toe walk supporting her entire body weight on each leg.     When squatting to the chair the patient is able to use eccentric muscle contractions of the hip flexors and knee extensors to lower her body weight slowly and well controlled into the chair.    The patient also states that she is not able to bend forward because of severe pain however when I asked the patient to take off and put on her shoes she bends down completely at the waist with full range of motion reaches down to her feet with no significant grimacing or signs of pain and is able to put on her shoes and socks with ease.    Slump test was negative bilaterally.    The above suggests normal range of motion within the lumbar spine as well as normal strength in the bilateral lower extremities.    Poor effort on exam with the patient exhibiting no effort for manual muscle testing of the lower extremities was limited utility.    Sensation is within normal limits of the key AIS dermatomes throughout the bilateral lower extremities.     No clonus, spasticity or hyper reflexia on exam.     MEDICAL DECISION MAKING    Medical records review: see under HPI section.     DATA    Labs:   Lab Results   Component Value Date/Time    SODIUM 133 (L) 03/20/2018 08:22 PM    POTASSIUM 4.0 03/20/2018 08:22 PM    CHLORIDE 100 03/20/2018 08:22 PM    CO2 25 03/20/2018 08:22 PM    GLUCOSE 87 03/20/2018 08:22 PM    BUN 16 03/20/2018 08:22 PM    CREATININE 0.64 03/20/2018 08:22 PM        Lab Results   Component Value Date/Time    PROTHROMBTM 14.1 03/20/2018 08:22  PM    INR 1.12 03/20/2018 08:22 PM        Lab Results   Component Value Date/Time    WBC 5.5 03/20/2018 08:22 PM    RBC 4.99 03/20/2018 08:22 PM    HEMOGLOBIN 11.7 (L) 03/20/2018 08:22 PM    HEMATOCRIT 37.6 03/20/2018 08:22 PM    MCV 75.4 (L) 03/20/2018 08:22 PM    MCH 23.4 (L) 03/20/2018 08:22 PM    MCHC 31.1 (L) 03/20/2018 08:22 PM    MPV 10.8 03/20/2018 08:22 PM    NEUTSPOLYS 47.70 03/20/2018 08:22 PM    LYMPHOCYTES 40.40 03/20/2018 08:22 PM    MONOCYTES 9.70 03/20/2018 08:22 PM    EOSINOPHILS 1.30 03/20/2018 08:22 PM    BASOPHILS 0.50 03/20/2018 08:22 PM          Imaging:     I reviewed the fluoroscopic images from 3/11/2018 showing left L5-S1 and S1 transforaminal epidural steroid injections with appropriate placement of needles as well as fluoroscopic contrast patterns.    I personally reviewed the following images again given the chief complaint of weakness.   MRI lumbar spine     Large extruded disc herniation L5-S1 with impingement on the left S1 nerve root and L5 nerve roots. Small disc herniation L4-5 with high intensity zone             IMAGING radiology reads. I reviewed the following radiology reads        MRI lumbar spine                                                                                                   Diagnosis   Diagnoses of Lumbar radiculopathy left L5 and S1 nerve, Chronic left-sided low back pain with left-sided sciatica, Lumbar disc herniation left L5-S1 and L4-5, Chronic, continuous use of opioids, Leg swelling, and Lupus erythematosus, unspecified form were pertinent to this visit.        ASSESSMENT:  Nancie Jernigan 42 y.o. female status post epidural steroid injection at the L5-S1 and S1 levels on the left side from 3/11/2018 which resulted in significant pain relief in the left low leg as well as absent neurotension signs which were previously positive. The patient continues to have left low back pain in the left buttocks. The patient is also decided to stop taking  methadone and oxycodone without the direction of physician is likely going through withdrawals of these medications which is resulted in diarrhea, nausea, flulike symptoms. The patient has been to the emergency department several times for this particular problem. I advised the patient follow up with her prescribing physician Dr. Stern.     The patient's chief complaint was weakness of the bilateral lower extremities however with additional testing the patient has normal strength of bilateral lower extremities as evidence of her ability to toe walk, heel walk and squat with a controlled eccentric motion. However on manual muscle testing the patient was getting poor effort on exam which makes this unreliable. There are no signs of a spinal cord injury on exam. There are no upper motor neuron signs on exam.    The patient is also a significant leg swelling of bilateral lower extremities and has a history of systemic lupus erythematosus and has not had follow-up with a rheumatologist nor her primary care physician. I advised patient to follow up with her PCP. PCP to consider referral to rheumatology. I again reiterated with the patient that she needs to follow up with her PCP in regards the bilateral lower extremity swelling. The patient states that she previously did not see a rheumatologist because her appointment was 3 months out and she cannot remember that far into the future. I advised the patient to use a calendar function on her phone.         Nancie was seen today for follow-up.    Diagnoses and all orders for this visit:    Lumbar radiculopathy left L5 and S1 nerve  Comments:  Radicular component has improved.    Chronic left-sided low back pain with left-sided sciatica    Lumbar disc herniation left L5-S1 and L4-5    Chronic, continuous use of opioids  Comments:  The patient abruptly stopped taking methadone as well as oxycodone on her own. This is likely the etiology for diarrhea and flulike symptoms. I  advised the patient to follow up with her prescribing physician    Leg swelling  Comments:  Workup in the emergency department was negative for congestive heart failure, cardiac etiology, DVT.    Lupus erythematosus, unspecified form  Comments:  I advised the patient discussed lupus as well as leg swelling with her PCP. PCP to Consider referral to rheumatology.        Follow-up: 2 weeks after the procedure. Should the patient did well with the procedure and I will likely recommend physical therapy following the procedure.    Total time: 37 minutes face-to-face time. I spent greater than 50% of the time for patient care and coordination on this date, including with the patient as per assessment and plan above.         Please note that this dictation was created using voice recognition software. I have made every reasonable attempt to correct obvious errors but there may be errors of grammar and content that I may have overlooked prior to finalization of this note.      Erick Yoder MD  Physical Medicine and Rehabilitation  Interventional Spine and Sports Physiatry  [facility]  3/28/2018   12:39 PM              CC ISABEL Leigh

## 2018-04-02 ENCOUNTER — OFFICE VISIT (OUTPATIENT)
Dept: URGENT CARE | Facility: PHYSICIAN GROUP | Age: 43
End: 2018-04-02
Payer: MEDICAID

## 2018-04-02 VITALS
SYSTOLIC BLOOD PRESSURE: 120 MMHG | DIASTOLIC BLOOD PRESSURE: 68 MMHG | BODY MASS INDEX: 26.84 KG/M2 | HEART RATE: 88 BPM | TEMPERATURE: 97.1 F | OXYGEN SATURATION: 100 % | WEIGHT: 171 LBS | HEIGHT: 67 IN | RESPIRATION RATE: 16 BRPM

## 2018-04-02 DIAGNOSIS — R11.0 CHRONIC NAUSEA: ICD-10-CM

## 2018-04-02 DIAGNOSIS — K04.7 DENTAL ABSCESS: ICD-10-CM

## 2018-04-02 PROCEDURE — 99214 OFFICE O/P EST MOD 30 MIN: CPT | Performed by: PHYSICIAN ASSISTANT

## 2018-04-02 RX ORDER — AMOXICILLIN 875 MG/1
875 TABLET, COATED ORAL 2 TIMES DAILY
Qty: 20 TAB | Refills: 0 | Status: SHIPPED | OUTPATIENT
Start: 2018-04-02 | End: 2018-06-28

## 2018-04-02 RX ORDER — ONDANSETRON 4 MG/1
4 TABLET, FILM COATED ORAL EVERY 4 HOURS PRN
Qty: 20 TAB | Refills: 0 | Status: SHIPPED | OUTPATIENT
Start: 2018-04-02 | End: 2018-04-07

## 2018-04-02 RX ORDER — METOCLOPRAMIDE 5 MG/1
5 TABLET ORAL 3 TIMES DAILY PRN
Qty: 60 TAB | Refills: 0 | Status: SHIPPED | OUTPATIENT
Start: 2018-04-02 | End: 2018-06-28

## 2018-04-02 ASSESSMENT — ENCOUNTER SYMPTOMS
CHILLS: 0
VOMITING: 0
FEVER: 0
NAUSEA: 1
CARDIOVASCULAR NEGATIVE: 1
ABDOMINAL PAIN: 0
RESPIRATORY NEGATIVE: 1
DIARRHEA: 0
SINUS PRESSURE: 0

## 2018-04-03 NOTE — PROGRESS NOTES
Subjective:      Nancie Jernigan is a 42 y.o. female who presents with Nausea and Dental Pain            Dental Pain    This is a new problem. The current episode started in the past 7 days. The problem occurs constantly. The problem has been gradually worsening. The pain is at a severity of 6/10. The pain is mild. Associated symptoms include difficulty swallowing and facial pain. Pertinent negatives include no fever, oral bleeding, sinus pressure or thermal sensitivity. She has tried nothing for the symptoms. The treatment provided no relief.   Chronic dental pain. She is obtaining dental insurance soon and plans to have multiple extractions. She is here for antibiotics. She is on long-term opioid pain medication for unrelated conditions. She is on a pain contract. She is NOT requesting pain medication today.      PMH:  has a past medical history of Lupus.  MEDS:   Current Outpatient Prescriptions:   •  furosemide (LASIX) 20 MG Tab, Take 20 mg by mouth every day., Disp: , Rfl:   •  spironolactone (ALDACTONE) 25 MG Tab, Take 25 mg by mouth every day., Disp: , Rfl:   •  potassium chloride (KLOR-CON) 20 MEQ Pack, Take 20 mEq by mouth every day., Disp: , Rfl:   •  gabapentin (NEURONTIN) 300 MG Cap, Take 3 Caps by mouth 3 times a day., Disp: 180 Cap, Rfl: 3  •  cyclobenzaprine (FLEXERIL) 10 MG Tab, Take 10 mg by mouth 3 times a day as needed., Disp: , Rfl:   ALLERGIES:   Allergies   Allergen Reactions   • Diflucan [Fd&C Red #40 [place]-Fluconazole]      SURGHX:   Past Surgical History:   Procedure Laterality Date   • PRIMARY C SECTION       SOCHX:  reports that she has been smoking Cigarettes.  She has a 1.50 pack-year smoking history. She uses smokeless tobacco. She reports that she does not drink alcohol or use drugs.  FH: family history is not on file.    Review of Systems   Constitutional: Negative for chills and fever.   HENT: Negative.  Negative for sinus pressure.         Chronic right-sided dental pain.  "  Respiratory: Negative.    Cardiovascular: Negative.    Gastrointestinal: Positive for nausea. Negative for abdominal pain, diarrhea and vomiting.       Medications, Allergies, and current problem list reviewed today in Epic     Objective:     /68   Pulse 88   Temp 36.2 °C (97.1 °F)   Resp 16   Ht 1.702 m (5' 7\")   Wt 77.6 kg (171 lb)   SpO2 100%   BMI 26.78 kg/m²      Physical Exam   Constitutional: She is oriented to person, place, and time. She appears well-developed and well-nourished. No distress.   HENT:   Head: Normocephalic and atraumatic.   Mouth/Throat: Oropharynx is clear and moist. Abnormal dentition (poor dentition. Several cracked/broken teeth. Several exposed roots.). Dental caries present. No dental abscesses. No oropharyngeal exudate.   Eyes: Conjunctivae are normal.   Neck: Normal range of motion. Neck supple.   Cardiovascular: Normal rate, regular rhythm and normal heart sounds.    Pulmonary/Chest: Effort normal and breath sounds normal. No respiratory distress. She has no wheezes.   Neurological: She is alert and oriented to person, place, and time.   Skin: Skin is warm and dry. She is not diaphoretic.   Psychiatric: She has a normal mood and affect. Her behavior is normal. Judgment and thought content normal.   Nursing note and vitals reviewed.              Assessment/Plan:     1. Dental abscess  amoxicillin (AMOXIL) 875 MG tablet   2. Chronic nausea  ondansetron (ZOFRAN) 4 MG Tab tablet    metoclopramide (REGLAN) 5 MG tablet     Antibiotics for chronic dental abscess.  Refilled nausea medication given  OTC meds and conservative measures as discussed  Return to clinic or go to ED if symptoms worsen or persist. Indications for ED discussed at length. Patient voices understanding. Follow-up with your primary care provider in 3-5 days. Red flags discussed. All side effects of medication discussed including allergic response, GI upset, tendon injury, etc.    Please note that this " dictation was created using voice recognition software. I have made every reasonable attempt to correct obvious errors, but I expect that there are errors of grammar and possibly content that I did not discover before finalizing the note.

## 2018-06-18 ENCOUNTER — TELEPHONE (OUTPATIENT)
Dept: PHYSICAL MEDICINE AND REHAB | Facility: MEDICAL CENTER | Age: 43
End: 2018-06-18

## 2018-06-18 NOTE — TELEPHONE ENCOUNTER
The patient also had a history of leg swelling and a dental abscess. I would need to see her in clinic for an evaluation prior to another injection     Dr Yoder

## 2018-06-18 NOTE — TELEPHONE ENCOUNTER
Reji Yoder, This Patient just called and said that the last injection she was given is wearing off. She reports numbness in arms, weakness in legs, and vega horses in legs and toes. She is requesting another injection. She does not have medicaid FFS anymore, she now has [employer], which will take about a week and a half for the auth, I put her on the books for the 28th, though I did let her know that ultimately it was your decision what would be taking place at the visit. Thank you! HAIM

## 2018-06-28 ENCOUNTER — OFFICE VISIT (OUTPATIENT)
Dept: PHYSICAL MEDICINE AND REHAB | Facility: MEDICAL CENTER | Age: 43
End: 2018-06-28
Payer: COMMERCIAL

## 2018-06-28 VITALS
DIASTOLIC BLOOD PRESSURE: 78 MMHG | BODY MASS INDEX: 27.15 KG/M2 | WEIGHT: 173 LBS | HEIGHT: 67 IN | OXYGEN SATURATION: 97 % | SYSTOLIC BLOOD PRESSURE: 122 MMHG | TEMPERATURE: 97.5 F | HEART RATE: 68 BPM

## 2018-06-28 DIAGNOSIS — M51.26 LUMBAR DISC HERNIATION: ICD-10-CM

## 2018-06-28 DIAGNOSIS — K00.9 DENTAL ANOMALY: ICD-10-CM

## 2018-06-28 DIAGNOSIS — M54.16 LUMBAR RADICULOPATHY: ICD-10-CM

## 2018-06-28 PROCEDURE — 99214 OFFICE O/P EST MOD 30 MIN: CPT | Performed by: PHYSICAL MEDICINE & REHABILITATION

## 2018-06-28 RX ORDER — ACETAMINOPHEN 500 MG
1000 TABLET ORAL 3 TIMES DAILY PRN
Qty: 180 TAB | Refills: 3 | Status: SHIPPED | OUTPATIENT
Start: 2018-06-28

## 2018-06-28 ASSESSMENT — PAIN SCALES - GENERAL: PAINLEVEL: 7=MODERATE-SEVERE PAIN

## 2018-06-28 NOTE — PROGRESS NOTES
Follow up patient note  Interventional spine and sports physiatry, Physical medicine rehabilitation      Chief complaint:   Chief Complaint   Patient presents with   • Follow-Up     lower back pain         HISTORY    Please see new patient note dated 6/18/2018  by Dr Yoder,  for more details.     HPI  Interval history:  In the interim the patient is gotten off of all pain medications and all controlled substances, she has been taking Tylenol as needed and notes that cognitively she has significantly improved to the point where the patient has received a promotion at her job one before she was thinking about quitting her job.  The previous epidural gave significant pain relief for several months and now the pain is started to come back in the left low back radiating down the left leg, worse with sitting, moderate to severe intensity, shooting in quality.  Given the excellent response to previous epidural she is wishing to inquire if this may be repeated.    The patient describes intermittent dental issues with intermittent antibiotics with occasional dental pus and currently she is having dental pain is in the process of seeing a her dentist for follow-up.       ROS Red Flags :   Fever, Chills, Sweats: Denies  Involuntary Weight Loss: Denies  Bowel/Bladder Incontinence: Denies  Saddle Anesthesia: Denies        PMHx:   Past Medical History:   Diagnosis Date   • Lupus        PSHx:   Past Surgical History:   Procedure Laterality Date   • PRIMARY C SECTION         Family history   History reviewed. No pertinent family history.      Medications:   Current Outpatient Prescriptions   Medication   • acetaminophen (TYLENOL) 500 MG Tab   • furosemide (LASIX) 20 MG Tab   • spironolactone (ALDACTONE) 25 MG Tab   • potassium chloride (KLOR-CON) 20 MEQ Pack   • gabapentin (NEURONTIN) 300 MG Cap   • cyclobenzaprine (FLEXERIL) 10 MG Tab     No current facility-administered medications for this visit.        Allergies:   Allergies  "  Allergen Reactions   • Diflucan [Fd&C Red #40 [place]-Fluconazole]        Social Hx:   Social History     Social History   • Marital status: Single     Spouse name: N/A   • Number of children: N/A   • Years of education: N/A     Occupational History   • Not on file.     Social History Main Topics   • Smoking status: Never Smoker   • Smokeless tobacco: Current User   • Alcohol use No   • Drug use: No   • Sexual activity: Not on file     Other Topics Concern   •  Service No   • Blood Transfusions No   • Caffeine Concern No   • Occupational Exposure No   • Hobby Hazards No   • Sleep Concern Yes   • Stress Concern Yes   • Weight Concern No   • Special Diet No   • Back Care No   • Exercise No   • Bike Helmet No   • Seat Belt Yes   • Self-Exams Yes     Social History Narrative   • No narrative on file         EXAMINATION     Physical Exam:   Vitals: Blood pressure 122/78, pulse 68, temperature 36.4 °C (97.5 °F), height 1.702 m (5' 7\"), weight 78.5 kg (173 lb), SpO2 97 %.    Constitutional:   Body Habitus: Body mass index is 27.1 kg/m².  Cooperation: Fully cooperates with exam  Appearance: Well-groomed no disheveled     ENT: Dental caries with multiple rotted teeth, tenderness to palpation on the gumline.  No pus, no fluctuance  Respiratory-  breathing comfortable on room air, no audible wheezing  Cardiovascular- capillary refills less than 2 seconds. No lower extremity edema is noted.   Psychiatric- alert and oriented ×3. Normal affect.   Spine: No tenderness to palpation of the lumbar spine or paraspinous muscles.  No tenderness percussion.  Strength 5/5 in the bilateral lower cavities.  Straight leg raise test and slump test are positive on the left and negative on the right.          MEDICAL DECISION MAKING    DATA    Labs:   Lab Results   Component Value Date/Time    SODIUM 133 (L) 03/20/2018 08:22 PM    POTASSIUM 4.0 03/20/2018 08:22 PM    CHLORIDE 100 03/20/2018 08:22 PM    CO2 25 03/20/2018 08:22 PM    " GLUCOSE 87 03/20/2018 08:22 PM    BUN 16 03/20/2018 08:22 PM    CREATININE 0.64 03/20/2018 08:22 PM        Lab Results   Component Value Date/Time    PROTHROMBTM 14.1 03/20/2018 08:22 PM    INR 1.12 03/20/2018 08:22 PM        Lab Results   Component Value Date/Time    WBC 5.5 03/20/2018 08:22 PM    RBC 4.99 03/20/2018 08:22 PM    HEMOGLOBIN 11.7 (L) 03/20/2018 08:22 PM    HEMATOCRIT 37.6 03/20/2018 08:22 PM    MCV 75.4 (L) 03/20/2018 08:22 PM    MCH 23.4 (L) 03/20/2018 08:22 PM    MCHC 31.1 (L) 03/20/2018 08:22 PM    MPV 10.8 03/20/2018 08:22 PM    NEUTSPOLYS 47.70 03/20/2018 08:22 PM    LYMPHOCYTES 40.40 03/20/2018 08:22 PM    MONOCYTES 9.70 03/20/2018 08:22 PM    EOSINOPHILS 1.30 03/20/2018 08:22 PM    BASOPHILS 0.50 03/20/2018 08:22 PM        No results found for: HBA1C       Imaging: I personally reviewed following images    I reviewed the following radiology reports                                                     Results for orders placed during the hospital encounter of 02/20/18   MR-LUMBAR SPINE-W/O    Impression 1.  There is large left paracentral disc extrusion at L5-S1 causing severe stenosis of the left lateral recess. This is causing impingement of the exiting left S1 nerve root at the lateral recess. There has been no significant interval change.  2.  Degenerative disease as described above.                                                DIAGNOSIS   Visit Diagnoses     ICD-10-CM   1. Lumbar radiculopathy left L5 and S1 nerve M54.16   2. Lumbar disc herniation left L5-S1 and L4-5 M51.26   3. Dental anomaly K00.9         ASSESSMENT and PLAN:     Nancie Jernigan 43 y.o. female with chronic left low back pain with a recent flare of her left low back pain radiating down the left leg consistent with left L5 and S1 radiculopathy responded very well to previous epidural of the left L5-S1 and S1 spaces.  I believe the patient would again benefit from this procedure however I would like a note from the  patient's dentist stating that she has no current signs of infection prior to the procedure.    Nancie was seen today for follow-up.    Diagnoses and all orders for this visit:    Lumbar radiculopathy left L5 and S1 nerve  -     acetaminophen (TYLENOL) 500 MG Tab; Take 2 Tabs by mouth 3 times a day as needed. Do not exceed 3000mg per day  -     REFERRAL TO PHYSICIAL MEDICINE REHAB    Lumbar disc herniation left L5-S1 and L4-5  -     acetaminophen (TYLENOL) 500 MG Tab; Take 2 Tabs by mouth 3 times a day as needed. Do not exceed 3000mg per day    Dental anomaly  Comments:  I recommended the patient follow-up with a dentist.  I asked the patient to obtain a note when there are no signs of dental infection          Follow up: 2 weeks after the procedure    Thank you for allowing me to participate in the care of this patient. If you have any questions please not hesitate to contact me.          Please note that this dictation was created using voice recognition software. I have made every reasonable attempt to correct obvious errors but there may be errors of grammar and content that I may have overlooked prior to finalization of this note.      Erick Yoder MD  Interventional Spine and Sports Physiatry  Physical Medicine and Rehabilitation  [facility]  6/28/2018  4:43 PM

## 2018-07-23 ENCOUNTER — TELEPHONE (OUTPATIENT)
Dept: PHYSICAL MEDICINE AND REHAB | Facility: MEDICAL CENTER | Age: 43
End: 2018-07-23

## 2018-07-23 NOTE — TELEPHONE ENCOUNTER
Pt called and she mentioned that she had an S/P scheduled on 7/30/18 w/ Dr. Yoder.  Pt was required to have a Dental check up to check if she don't have costa dental infection.  Pt mentioned that she don't have any dental insurance for now and it cost her a lot of money to get it, so what she did is that she went to the ER and she was given an antibiotics and her plan was to go back to the ER to have a clearance that she is good to have a Epidural injection.    I explained everything to her as per Dr. Yoder that he highly recommend is the Dentist due to Epidural can make your Immune system low.  As per Pt mentioned that she has a high Immune system due to her Lupus.    Pt is very eager to have Epidural injection because she is scared if the pain get worst and she will end up in the wheelchair.    Pt wanting to talk to you.    Thank you  Ena

## 2018-07-24 ENCOUNTER — TELEPHONE (OUTPATIENT)
Dept: PHYSICAL MEDICINE AND REHAB | Facility: MEDICAL CENTER | Age: 43
End: 2018-07-24

## 2018-07-24 NOTE — TELEPHONE ENCOUNTER
Zainab from rehab scheduling called me about Pt Insurance that is out of network.    Zainab told me that it cost her 3,322.90 and she need to pay 350.00 dollar or higher sort of deposit.    If she can pay less than 350 dollars then she need to call Zainab for financial screening at [phone].    Thank you  Ena

## 2018-07-25 NOTE — TELEPHONE ENCOUNTER
I spoke to the Pt and notified to contact Zainab to discuss about the co pay on her special procedure on Monday.    Thank you  Ena

## 2018-07-30 ENCOUNTER — HOSPITAL ENCOUNTER (OUTPATIENT)
Dept: RADIOLOGY | Facility: REHABILITATION | Age: 43
End: 2018-07-30
Attending: PHYSICAL MEDICINE & REHABILITATION
Payer: COMMERCIAL

## 2018-07-30 ENCOUNTER — HOSPITAL ENCOUNTER (OUTPATIENT)
Dept: PAIN MANAGEMENT | Facility: REHABILITATION | Age: 43
End: 2018-07-30
Attending: PHYSICAL MEDICINE & REHABILITATION
Payer: COMMERCIAL

## 2018-07-30 VITALS
HEART RATE: 59 BPM | HEIGHT: 67 IN | TEMPERATURE: 97.4 F | OXYGEN SATURATION: 100 % | DIASTOLIC BLOOD PRESSURE: 71 MMHG | SYSTOLIC BLOOD PRESSURE: 129 MMHG | RESPIRATION RATE: 16 BRPM | BODY MASS INDEX: 27.47 KG/M2 | WEIGHT: 175.04 LBS

## 2018-07-30 PROCEDURE — 700117 HCHG RX CONTRAST REV CODE 255

## 2018-07-30 PROCEDURE — 64483 NJX AA&/STRD TFRM EPI L/S 1: CPT

## 2018-07-30 PROCEDURE — 700111 HCHG RX REV CODE 636 W/ 250 OVERRIDE (IP)

## 2018-07-30 PROCEDURE — 64484 NJX AA&/STRD TFRM EPI L/S EA: CPT

## 2018-07-30 RX ORDER — DEXAMETHASONE SODIUM PHOSPHATE 10 MG/ML
INJECTION, SOLUTION INTRAMUSCULAR; INTRAVENOUS
Status: COMPLETED
Start: 2018-07-30 | End: 2018-07-30

## 2018-07-30 RX ORDER — LIDOCAINE HYDROCHLORIDE 10 MG/ML
INJECTION, SOLUTION EPIDURAL; INFILTRATION; INTRACAUDAL; PERINEURAL
Status: COMPLETED
Start: 2018-07-30 | End: 2018-07-30

## 2018-07-30 RX ADMIN — LIDOCAINE HYDROCHLORIDE 10 ML: 10 INJECTION, SOLUTION EPIDURAL; INFILTRATION; INTRACAUDAL; PERINEURAL at 15:25

## 2018-07-30 RX ADMIN — IOHEXOL 2 ML: 240 INJECTION, SOLUTION INTRATHECAL; INTRAVASCULAR; INTRAVENOUS; ORAL at 15:28

## 2018-07-30 RX ADMIN — DEXAMETHASONE SODIUM PHOSPHATE 20 MG: 10 INJECTION, SOLUTION INTRAMUSCULAR; INTRAVENOUS at 15:37

## 2018-07-30 ASSESSMENT — PAIN SCALES - GENERAL
PAINLEVEL_OUTOF10: 4
PAINLEVEL_OUTOF10: 5

## 2018-07-30 NOTE — PROGRESS NOTES
Current medications reviewed with pt, see medications reconciliation form. Pt venecia taking ASA or other blood thinners or anti-inflammatories.  Pt has a ride post-procedure(Tim to drive).  Printed and verbal discharge instructions given to pt who verbalized understanding.

## 2018-07-30 NOTE — PROGRESS NOTES
Timeout : allergies, pertinent medical history, site & procedure. Positioned patient by CST, RN, X - ray Tech. Both lower legs & feet pillow placed for support. Hands supported on stool under head of the bed. Procedure tolerated well by patient. Accompanied to recovery room, ambulatory.

## 2018-07-30 NOTE — PROCEDURES
Date of Service: 7/30/2018     Patient: Nancie Jernigan 43 y.o. female     MRN: [id number]     Physician/s: Erick Yoder MD    Pre-operative Diagnosis: Lumbar radiculopathy    Post-operative Diagnosis: Lumbar radiculopathy    Procedure: left Lumbar Transforaminal Epidural Steroid  at the L5-S1 and S1 levels.     Description of procedure:    The risks, benefits, and alternatives of the procedure were reviewed and discussed with the patient.  Written informed consent was freely obtained. A pre-procedural time-out was conducted by the physician verifying patient’s identity, procedure to be performed, procedure site and side, and allergy verification. Appropriate equipment was determined to be in place for the procedure.       The patient's vital signs were carefully monitored before, throughout, and after the procedure.     In the fluoroscopy suite the patient was placed in a prone position, a pillow placed underneath their umbilicus. The skin was prepped and draped in the usual sterile fashion. The fluoroscope was placed over the lumbar spine and adjusted into the proper AP/Oblique view to enter the transforaminal space at the levels below. The targets for injection were then marked at the left L5-S1 and S1. A 27g 1.5 inch needle was placed into the marked site, and approx 2cc of 1% Lidocaine was injected subcutaneously into the epidermal and dermal layers. The needle was removed.  A 25g 3.5 inch spinal needle was then placed and advanced under fluoroscopic guidance in an oblique view towards the subpedicular epidural space of the levels noted below. The needle position was confirmed to not be past the 6 o'clock position in the AP view and it was in the neural foramen and the lateral view. Under live fluoroscopic guidance in the AP view, contrast dye was used to highlight the epidural space spread.  Following negative aspiration, approx 1mL of 1% lidocaine preservative free with 10 mg of dexamethasone was then  injected at each level, and the needles were removed intact after restyleted. The patient's back was covered with a 4x4 gauze, the area was cleansed with sterile normal saline, and a dressing was applied. There were no complications noted.     The patient was then evaluated post-procedure, and was hemodynamically stable prior to leaving the post-operative care unit.     A follow-up visit in clinic as scheduled for 8/16/2018     The patient did have procedural pain and anxiety and I would consider her for a procedure with sedation should a next procedure be required.    The patient states that she had near complete resolution of her pain immediately post procedure.       Erick Yoder MD  Physical Medicine and Rehabilitation  Interventional Spine and Sports Physiatry  [facility]          CPT codes  Transforaminal epidural injection- lumbar or sacral (first level):  69952  Transforaminal epidural injection- lumbar or sacral (each additional level):  60068

## 2018-08-01 ENCOUNTER — TELEPHONE (OUTPATIENT)
Dept: PHYSICAL MEDICINE AND REHAB | Facility: MEDICAL CENTER | Age: 43
End: 2018-08-01

## 2018-08-01 NOTE — TELEPHONE ENCOUNTER
Called Pt and LM to call us back in regards to her S/P that was done 7/30/18 w/ Dr. Yoder, to check how she been doing.    Thank you  Ena

## 2018-08-22 DIAGNOSIS — M54.42 CHRONIC LEFT-SIDED LOW BACK PAIN WITH LEFT-SIDED SCIATICA: ICD-10-CM

## 2018-08-22 DIAGNOSIS — G89.29 CHRONIC LEFT-SIDED LOW BACK PAIN WITH LEFT-SIDED SCIATICA: ICD-10-CM

## 2018-08-22 DIAGNOSIS — M54.16 LUMBAR RADICULOPATHY: ICD-10-CM

## 2018-08-22 DIAGNOSIS — M51.26 LUMBAR DISC HERNIATION: ICD-10-CM

## 2018-08-22 RX ORDER — GABAPENTIN 300 MG/1
900 CAPSULE ORAL 3 TIMES DAILY
Qty: 180 CAP | Refills: 3 | Status: SHIPPED | OUTPATIENT
Start: 2018-08-22 | End: 2018-11-30 | Stop reason: SDUPTHER

## 2018-11-30 ENCOUNTER — TELEPHONE (OUTPATIENT)
Dept: PHYSICAL MEDICINE AND REHAB | Facility: MEDICAL CENTER | Age: 43
End: 2018-11-30

## 2018-11-30 DIAGNOSIS — M54.16 LUMBAR RADICULOPATHY: ICD-10-CM

## 2018-11-30 DIAGNOSIS — M79.2 NERVE PAIN: ICD-10-CM

## 2018-11-30 DIAGNOSIS — G89.29 CHRONIC LEFT-SIDED LOW BACK PAIN WITH LEFT-SIDED SCIATICA: ICD-10-CM

## 2018-11-30 DIAGNOSIS — M51.26 LUMBAR DISC HERNIATION: ICD-10-CM

## 2018-11-30 DIAGNOSIS — M54.16 LUMBAR RADICULITIS: ICD-10-CM

## 2018-11-30 DIAGNOSIS — M54.42 CHRONIC LEFT-SIDED LOW BACK PAIN WITH LEFT-SIDED SCIATICA: ICD-10-CM

## 2018-11-30 RX ORDER — GABAPENTIN 300 MG/1
900 CAPSULE ORAL 3 TIMES DAILY
Qty: 90 CAP | Refills: 0 | Status: SHIPPED | OUTPATIENT
Start: 2018-11-30 | End: 2018-12-12 | Stop reason: SDUPTHER

## 2018-11-30 NOTE — PROGRESS NOTES
Received refill request for gabapentin.  This is a patient of Dr. Yoder, who is out of the office today.  I reviewed records.  Dr. Yoder is following the patient for low back pain, lumbar radicular pain, last seen in 7/2018.  The patient has pending follow-up appointment with Dr. Yoder.  I wrote prescription for gabapentin 300 mg 3 times per day as needed for nerve pain #90, refill 0, as covering physician, coordinate care.

## 2018-11-30 NOTE — TELEPHONE ENCOUNTER
Nancie called and said she would like a refill on Gabapentin 300mg 3 capsules three times daily. She said she took last of gabapentin yesterday.     I let her know Dr. Yoder is out of the office today, but I will check to see if Dr. Beaver will fill her Rx.     She made a follow up appt 1/9/18 with Dr. Yoder.

## 2018-12-01 NOTE — TELEPHONE ENCOUNTER
I left msg her medication needs authorization, and I was not able to get it done. I was on hold for half hour then disconnected.   I left [facility] that if she gets medication transferred to [facility] in [place], she will have to pay cash because insurance did not authorize.     I left msg if she is out of medication she can go to ER or urgent care in [place] to see if someone can help her there.

## 2018-12-12 DIAGNOSIS — M54.16 LUMBAR RADICULITIS: ICD-10-CM

## 2018-12-12 DIAGNOSIS — M79.2 NERVE PAIN: ICD-10-CM

## 2018-12-12 RX ORDER — GABAPENTIN 300 MG/1
900 CAPSULE ORAL 3 TIMES DAILY
Qty: 270 CAP | Refills: 0 | Status: SHIPPED | OUTPATIENT
Start: 2018-12-12 | End: 2019-01-11

## 2018-12-12 NOTE — TELEPHONE ENCOUNTER
Pt Dr. Yoder    Was the patient seen in the last year in this department? yes    Does patient have an active prescription for medications requested?yes    Do they have a refill on file?No    If a controlled substance, is a new  on file? No    Last refill : 11/30/18 Qty of 90 good for 10 days    Received Request Via: Pt request    If pharmacy requests, is the patient still taking the medication or medication discontinued? Still taking, last approve by Dr. Sd daniels for a week only

## 2019-01-09 ENCOUNTER — APPOINTMENT (OUTPATIENT)
Dept: PHYSICAL MEDICINE AND REHAB | Facility: MEDICAL CENTER | Age: 44
End: 2019-01-09
Payer: COMMERCIAL

## 2019-01-16 RX ORDER — GABAPENTIN 300 MG/1
CAPSULE ORAL
COMMUNITY
Start: 2018-12-01 | End: 2019-01-16 | Stop reason: SDUPTHER

## 2019-01-16 RX ORDER — GABAPENTIN 300 MG/1
CAPSULE ORAL
Qty: 270 CAP | Refills: 0 | Status: SHIPPED | OUTPATIENT
Start: 2019-01-16

## 2019-01-16 NOTE — TELEPHONE ENCOUNTER
Was the patient seen in the last year in this department? Yes    Does patient have an active prescription for medications requested? No    Do they have a refill on file? No    If a controlled substance, is a new  on file? NO    Received Request Via:Via Pharmacy    If pharmacy requests, is the patient still taking the medication or medication discontinued? Taking    FV : 2/7/19    Thank you  Ena

## 2020-07-21 ENCOUNTER — APPOINTMENT (OUTPATIENT)
Dept: PHYSICAL MEDICINE AND REHAB | Facility: MEDICAL CENTER | Age: 45
End: 2020-07-21
Payer: COMMERCIAL

## 2020-07-31 ENCOUNTER — TELEPHONE (OUTPATIENT)
Dept: PHYSICAL MEDICINE AND REHAB | Facility: MEDICAL CENTER | Age: 45
End: 2020-07-31

## 2020-07-31 NOTE — TELEPHONE ENCOUNTER
Pt called and asked if she could do the INJ at her next visit on 08/05 because she has to drive over 6 hours to get here, and a second trip would be taxing. Please advise.

## 2020-08-03 NOTE — TELEPHONE ENCOUNTER
IZABELLAM to call us back in regards to her request which we need to know what type of injection and which location.  Pt Insurance is [employer] which require an Auth.    Thank you  Ena

## 2020-08-05 ENCOUNTER — HOSPITAL ENCOUNTER (OUTPATIENT)
Facility: REHABILITATION | Age: 45
End: 2020-08-05
Attending: PHYSICAL MEDICINE & REHABILITATION | Admitting: PHYSICAL MEDICINE & REHABILITATION
Payer: COMMERCIAL

## 2020-08-05 ENCOUNTER — OFFICE VISIT (OUTPATIENT)
Dept: PHYSICAL MEDICINE AND REHAB | Facility: MEDICAL CENTER | Age: 45
End: 2020-08-05
Payer: COMMERCIAL

## 2020-08-05 VITALS
HEART RATE: 83 BPM | TEMPERATURE: 97.7 F | SYSTOLIC BLOOD PRESSURE: 130 MMHG | BODY MASS INDEX: 31.52 KG/M2 | HEIGHT: 67 IN | DIASTOLIC BLOOD PRESSURE: 70 MMHG | WEIGHT: 200.84 LBS | OXYGEN SATURATION: 93 %

## 2020-08-05 DIAGNOSIS — M79.2 NERVE PAIN: ICD-10-CM

## 2020-08-05 DIAGNOSIS — M54.16 LUMBAR RADICULOPATHY: ICD-10-CM

## 2020-08-05 DIAGNOSIS — G89.29 CHRONIC LEFT-SIDED LOW BACK PAIN WITH LEFT-SIDED SCIATICA: ICD-10-CM

## 2020-08-05 DIAGNOSIS — M54.42 ACUTE LEFT-SIDED LOW BACK PAIN WITH LEFT-SIDED SCIATICA: ICD-10-CM

## 2020-08-05 DIAGNOSIS — M54.42 CHRONIC LEFT-SIDED LOW BACK PAIN WITH LEFT-SIDED SCIATICA: ICD-10-CM

## 2020-08-05 PROCEDURE — 99214 OFFICE O/P EST MOD 30 MIN: CPT | Performed by: PHYSICAL MEDICINE & REHABILITATION

## 2020-08-05 RX ORDER — DAPSONE 100 MG/1
100 TABLET ORAL DAILY
COMMUNITY

## 2020-08-05 ASSESSMENT — PAIN SCALES - GENERAL: PAINLEVEL: 6=MODERATE PAIN

## 2020-08-05 ASSESSMENT — PATIENT HEALTH QUESTIONNAIRE - PHQ9: CLINICAL INTERPRETATION OF PHQ2 SCORE: 0

## 2020-08-05 NOTE — PATIENT INSTRUCTIONS
Your procedure will be at the [facility] special procedure suite.    [facility] [place] [address]       PRE-PROCEDURE INSTRUCTIONS  You may take your regular medications except:   · No Anti-inflammatories 5 days prior to your procedure. Anti-inflammatories include medicines such as  ibuprofen (Motrin, Advil), Excedrin, Naproxen (Aleve, Anaprox, Naprelan, Naprosyn), Celecoxib (Celebrex), Diclofenac (Voltaren-XR tab), and Meloxicam (Mobic).   · No Glucophage or Metformin 24 hours before your procedure. You may resume next day after your procedure.  · Call the physiatry office if you are taking or prescribed anti-biotics within five days of procedure.  · Please ask provider if you are taking any new diabetes medication.  · CONTINUE TAKING BLOOD PRESSURE MEDICATIONS AS PRESCRIBED.  · Pain medications will not be prescribed on the procedure day. Procedural pain medication may be used by your provider   · Call your doctor's office performing the procedure if you have a fever, chills, rash or new illness prior to your procedure    Anticoagulation/antiplatelet medications  No Blood thinning medications such as Coumadin or Plavix 5 days prior to procedure unless your doctor said to continue these medications. Call your doctor if a new medication is prescribed in this class.     Restrictions for eating before procedure:   · If you are getting procedural sedation, then do not eat to for 8 hours prior to procedure appointment time. Do not drink fluids for four hours prior to your procedure time.   · If you are not having procedural sedation, then Skip the meal prior to your procedure. If you have a morning procedure then skip breakfast. If you have an afternoon procedure then skip lunch.   · You may drink clear liquids up to 2 hours prior to your procedure  · You must have a  the day of procedure to accompany you home.      POST PROCEDURE INSTRUCTIONS   · No heavy lifting, strenuous bending or  strenuous exercise for 3 days after your procedure.  · No hot tubs, baths, swimming for 3 days after your procedure  · You can remove the bandage the day after the procedure.  · IF YOU RECEIVED A STEROID INJECTION. PLEASE NOTE THAT THERE MAY BE A DELAY FOR THE INJECTION TO START WORKING, THE DELAY MAY BE UP TO TWO WEEKS. IF YOU HAVE DIABETES, PLEASE NOTE THAT YOUR SUGAR LEVELS MAY BE ELEVATED FOR 1-2 DAYS AFTER A STEROID INJECTION.  THE STEROID MAY CAUSE TEMPORARY SYMPTOMS WHICH USUALLY RESOLVE ON THEIR OWN WITHIN 1 TO 2 DAYS INCLUDING FACIAL FLUSHING OR A FEELING OF WARMTH ON THE FACE, TEMPORARY INCREASES IN BLOOD SUGAR, INSOMNIA, INCREASED HUNGER  · IF YOU EXPERIENCE PROLONGED WEAKNESS LONGER THAN ONE DAY, BOWEL OR BLADDER INCONTINENCE THEN PLEASE CALL THE PHYSIATRY OFFICE.  · Your leg may feel heavy, weak and numb for up to 1-2 days. Be very careful walking.   ·  You may resume normal activities 3 days after procedure.

## 2020-08-05 NOTE — PROGRESS NOTES
Follow up patient note  Interventional spine and sports physiatry, Physical medicine rehabilitation      Chief complaint:   Chief Complaint   Patient presents with   • Follow-Up     Back Pain         HISTORY    Please see new patient note dated 6/18/2018  by Dr Yoder,  for more details.     HPI  The patient had approximately a year of pain relief after the previous L5-S1 and S1 transforaminal epidural steroid injection.  This pain is started to return in the left low back radiating down the left leg which is 6 out of 10 in intensity constant worse with sitting and bending.  The patient has an upcoming trip where she is driving to [place] over 3 days and she has difficulty with driving currently.       ROS Red Flags :   Fever, Chills, Sweats: Denies  Involuntary Weight Loss: Denies  Bowel/Bladder Incontinence: Denies  Saddle Anesthesia: Denies        PMHx:   Past Medical History:   Diagnosis Date   • Lupus (HCC)        PSHx:   Past Surgical History:   Procedure Laterality Date   • PRIMARY C SECTION         Family history   History reviewed. No pertinent family history.      Medications:   Current Outpatient Medications   Medication   • dapsone 100 MG Tab   • gabapentin (NEURONTIN) 300 MG Cap   • acetaminophen (TYLENOL) 500 MG Tab   • furosemide (LASIX) 20 MG Tab   • potassium chloride (KLOR-CON) 20 MEQ Pack     No current facility-administered medications for this visit.        Allergies:   Allergies   Allergen Reactions   • Baclofen      Swelling    • Diflucan [Fd&C Red #40 [place]-Fluconazole]        Social Hx:   Social History     Socioeconomic History   • Marital status: Single     Spouse name: Not on file   • Number of children: Not on file   • Years of education: Not on file   • Highest education level: Not on file   Occupational History   • Not on file   Social Needs   • Financial resource strain: Not on file   • Food insecurity     Worry: Not on file     Inability: Not on file   • Transportation needs      "Medical: Not on file     Non-medical: Not on file   Tobacco Use   • Smoking status: Never Smoker   • Smokeless tobacco: Current User   Substance and Sexual Activity   • Alcohol use: No   • Drug use: No   • Sexual activity: Not on file   Lifestyle   • Physical activity     Days per week: Not on file     Minutes per session: Not on file   • Stress: Not on file   Relationships   • Social connections     Talks on phone: Not on file     Gets together: Not on file     Attends Taoism service: Not on file     Active member of club or organization: Not on file     Attends meetings of clubs or organizations: Not on file     Relationship status: Not on file   • Intimate partner violence     Fear of current or ex partner: Not on file     Emotionally abused: Not on file     Physically abused: Not on file     Forced sexual activity: Not on file   Other Topics Concern   •  Service No   • Blood Transfusions No   • Caffeine Concern No   • Occupational Exposure No   • Hobby Hazards No   • Sleep Concern Yes   • Stress Concern Yes   • Weight Concern No   • Special Diet No   • Back Care No   • Exercise No   • Bike Helmet No   • Seat Belt Yes   • Self-Exams Yes   Social History Narrative   • Not on file         EXAMINATION     Physical Exam:   Vitals: /70 (BP Location: Left arm, Patient Position: Sitting, BP Cuff Size: Adult)   Pulse 83   Temp 36.5 °C (97.7 °F) (Temporal)   Ht 1.702 m (5' 7\")   Wt 91.1 kg (200 lb 13.4 oz)   SpO2 93%     Constitutional:   Body Habitus: Body mass index is 31.46 kg/m².  Cooperation: Fully cooperates with exam  Appearance: Well-groomed no disheveled     ENT: Dental caries with multiple rotted teeth, tenderness to palpation on the gumline.  No pus, no fluctuance  Respiratory-  breathing comfortable on room air, no audible wheezing  Cardiovascular- capillary refills less than 2 seconds. No lower extremity edema is noted.   Psychiatric- alert and oriented ×3. Normal affect.   MSK/neuro: "     There are no signs of infection around the injection sites.   decreased active range of motion with flexion, lateral flexion, and rotation bilaterally.   There is decreased active range of motion with lumbar extension.      Palpation:   No tenderness to palpation in midline at T1-T12 levels. No tenderness to palpation in the left and right of the midline T1-L5, NEGATIVE for tenderness to palpation to the para-midline region in the lower lumbar levels.  palpation over SI joint: negative bilaterally    palpation in hip or over the greater trochanters: negative bilaterally      Lumbar spine Special tests  Neuro tension  Straight leg test negative right, positive left    Slump test negative right, positive left      Key points for the international standards for neurological classification of spinal cord injury (ISNCSCI) to light touch.     Dermatome R L                                      L2 2 2   L3 2 2   L4 2 2   L5 2 1   S1 2 1   S2 2 2         Motor Exam Lower Extremities    ? Myotome R L   Hip flexion L2 5 5   Knee extension L3 5 5   Ankle dorsiflexion L4 5 5   Toe extension L5 5 4   Ankle plantarflexion S1 5 5-                 MEDICAL DECISION MAKING    DATA    Labs:   Lab Results   Component Value Date/Time    SODIUM 133 (L) 03/20/2018 08:22 PM    POTASSIUM 4.0 03/20/2018 08:22 PM    CHLORIDE 100 03/20/2018 08:22 PM    CO2 25 03/20/2018 08:22 PM    GLUCOSE 87 03/20/2018 08:22 PM    BUN 16 03/20/2018 08:22 PM    CREATININE 0.64 03/20/2018 08:22 PM        Lab Results   Component Value Date/Time    PROTHROMBTM 14.1 03/20/2018 08:22 PM    INR 1.12 03/20/2018 08:22 PM        Lab Results   Component Value Date/Time    WBC 5.5 03/20/2018 08:22 PM    RBC 4.99 03/20/2018 08:22 PM    HEMOGLOBIN 11.7 (L) 03/20/2018 08:22 PM    HEMATOCRIT 37.6 03/20/2018 08:22 PM    MCV 75.4 (L) 03/20/2018 08:22 PM    MCH 23.4 (L) 03/20/2018 08:22 PM    MCHC 31.1 (L) 03/20/2018 08:22 PM    MPV 10.8 03/20/2018 08:22 PM    NEUTSPOLYS  47.70 2018 08:22 PM    LYMPHOCYTES 40.40 2018 08:22 PM    MONOCYTES 9.70 2018 08:22 PM    EOSINOPHILS 1.30 2018 08:22 PM    BASOPHILS 0.50 2018 08:22 PM        No results found for: HBA1C       Imaging: I personally reviewed following images    I reviewed the following radiology reports                                                     Results for orders placed during the hospital encounter of 18   MR-LUMBAR SPINE-W/O    Impression 1.  There is large left paracentral disc extrusion at L5-S1 causing severe stenosis of the left lateral recess. This is causing impingement of the exiting left S1 nerve root at the lateral recess. There has been no significant interval change.  2.  Degenerative disease as described above.                                                DIAGNOSIS   Visit Diagnoses     ICD-10-CM   1. Lumbar radiculopathy  M54.16   2. Nerve pain  M79.2   3. Chronic left-sided low back pain with left-sided sciatica  M54.42    G89.29   4. Acute left-sided low back pain with left-sided sciatica  M54.42         ASSESSMENT and PLAN:     Nancie Delon, GAVINO 1975     Nancie was seen today for follow-up.    Diagnoses and all orders for this visit:    Lumbar radiculopathy  -     REFERRAL TO PHYSICAL MEDICINE REHAB    Nerve pain    Chronic left-sided low back pain with left-sided sciatica    Acute left-sided low back pain with left-sided sciatica      The patient is failed physical therapy, home exercise program, medication management with NSAIDs, gabapentin, muscle relaxers, Tylenol in the past.  She has tried doing her stretching routine at home exercise program over the past 3 months and had no improvement in her symptoms.    Okay to continue gabapentin.    Stop NSAIDs 5 days prior to the procedure below    I have ordered a left L5-S1 and S1 transforaminal epidural steroid injection    The risks benefits and alternatives to this procedure were discussed and the patient  wishes to proceed with the procedure. Risks include but are not limited to damage to surrounding structures, infection, bleeding, worsening of pain which can be permanent, weakness which can be permanent. Benefits include pain relief, improved function. Alternatives includes not doing the procedure.          Follow up: approximately 2 to 4 weeks after the above procedure    Thank you for allowing me to participate in the care of this patient. If you have any questions please not hesitate to contact me.          Please note that this dictation was created using voice recognition software. I have made every reasonable attempt to correct obvious errors but there may be errors of grammar and content that I may have overlooked prior to finalization of this note.      Erick Yoder MD  Interventional Spine and Sports Physiatry  Physical Medicine and Rehabilitation  [facility]

## 2020-08-07 NOTE — NON-PROVIDER
PAT call made 08/07/2020 at 1347. No answer at number provided. Message with call back number provided. Pt was informed that it is necessary that he returns this call to review pre-procedure instructions and to do the pre-screening before 3 PM on Monday (08/10/20), Failure to return this call will result in the appt being rescheduled.

## 2020-08-10 NOTE — NON-PROVIDER
PAT note: PAT return call made 08/10/2020 at 0951. Spoke with Nancie. Health history, allergies, medications and pre-procedure/sedation instructions reviewed with patient. Pre-procedure respiratory screening completed. Pt denies being sick/symptomatic(fever, cough, shortness of breath)  within 72 hours or having been in close contact with symptomatic individuals in the past 2 weeks.Pt was informed to contact her physician should she or any close personal contact become symptomatic prior to the procedure. Pt was told to bring a mask as she would be wearing it during the entire stay. It was recommended that the pt self isolate until the procedure and that her  would have to remain on site in vehicle til pt is d/dante. Pt verbalized understanding of instructions.Pt took ibuprofen 2 tabs (OTC) at 0645 this morning. Instructed not to take any more prior to procedure. Dr. Yoder made aware of ibuprofen usage prior to procedure and said it was ok as long as she doesn't take any more.

## 2020-08-14 ENCOUNTER — HOSPITAL ENCOUNTER (OUTPATIENT)
Dept: RADIOLOGY | Facility: MEDICAL CENTER | Age: 45
End: 2020-08-14
Payer: COMMERCIAL

## 2020-09-10 ENCOUNTER — TELEPHONE (OUTPATIENT)
Dept: PHYSICAL MEDICINE AND REHAB | Facility: MEDICAL CENTER | Age: 45
End: 2020-09-10

## 2022-12-22 ENCOUNTER — OFFICE VISIT (OUTPATIENT)
Dept: PHYSICAL MEDICINE AND REHAB | Facility: MEDICAL CENTER | Age: 47
End: 2022-12-22

## 2022-12-22 DIAGNOSIS — M54.50 LOW BACK PAIN, UNSPECIFIED BACK PAIN LATERALITY, UNSPECIFIED CHRONICITY, UNSPECIFIED WHETHER SCIATICA PRESENT: ICD-10-CM

## 2022-12-22 PROCEDURE — 99999 PR NO CHARGE: CPT | Performed by: PHYSICAL MEDICINE & REHABILITATION

## 2022-12-22 NOTE — PROGRESS NOTES
The patient elected to cancel today's visit and rescheduled the visit when she has verified her insurance. This could not be verified by our staff.

## 2023-01-10 ENCOUNTER — OFFICE VISIT (OUTPATIENT)
Dept: PHYSICAL MEDICINE AND REHAB | Facility: MEDICAL CENTER | Age: 48
End: 2023-01-10
Payer: MEDICAID

## 2023-01-10 DIAGNOSIS — M54.50 LOW BACK PAIN, UNSPECIFIED BACK PAIN LATERALITY, UNSPECIFIED CHRONICITY, UNSPECIFIED WHETHER SCIATICA PRESENT: ICD-10-CM

## 2023-01-10 PROCEDURE — 99999 PR NO CHARGE: CPT | Performed by: PHYSICAL MEDICINE & REHABILITATION

## 2023-02-21 ENCOUNTER — TELEPHONE (OUTPATIENT)
Dept: PHYSICAL MEDICINE AND REHAB | Facility: MEDICAL CENTER | Age: 48
End: 2023-02-21

## 2023-02-21 ENCOUNTER — APPOINTMENT (OUTPATIENT)
Dept: PHYSICAL MEDICINE AND REHAB | Facility: MEDICAL CENTER | Age: 48
End: 2023-02-21
Payer: MEDICAID

## 2023-02-21 NOTE — TELEPHONE ENCOUNTER
VOICEMAIL  1. Caller Name: Nancie Jernigan                       Call Back Number: [phone] (home)      2. Message: wants to confirm when appointment is     3. Patient approves office to leave a detailed voicemail/MyChart message: N\A
